# Patient Record
Sex: MALE | Race: BLACK OR AFRICAN AMERICAN | Employment: FULL TIME | URBAN - METROPOLITAN AREA
[De-identification: names, ages, dates, MRNs, and addresses within clinical notes are randomized per-mention and may not be internally consistent; named-entity substitution may affect disease eponyms.]

---

## 2018-02-09 PROBLEM — E66.9 CLASS 2 OBESITY WITH BODY MASS INDEX (BMI) OF 39.0 TO 39.9 IN ADULT: Status: ACTIVE | Noted: 2018-02-09

## 2018-02-09 PROBLEM — K43.6 VENTRAL HERNIA WITH OBSTRUCTION: Status: ACTIVE | Noted: 2018-02-09

## 2018-02-09 PROBLEM — M62.08 RECTUS DIASTASIS: Status: ACTIVE | Noted: 2018-02-09

## 2018-02-09 PROBLEM — M79.89 SOFT TISSUE MASS: Status: ACTIVE | Noted: 2018-02-09

## 2018-02-15 ENCOUNTER — HOSPITAL ENCOUNTER (OUTPATIENT)
Dept: CT IMAGING | Age: 48
Discharge: HOME | End: 2018-02-15
Attending: SURGERY
Payer: COMMERCIAL

## 2018-02-15 DIAGNOSIS — K43.6 VENTRAL HERNIA WITH OBSTRUCTION: ICD-10-CM

## 2018-02-15 DIAGNOSIS — M62.08 RECTUS DIASTASIS: ICD-10-CM

## 2018-02-15 DIAGNOSIS — E66.9 CLASS 2 OBESITY WITH BODY MASS INDEX (BMI) OF 39.0 TO 39.9 IN ADULT, UNSPECIFIED OBESITY TYPE, UNSPECIFIED WHETHER SERIOUS COMORBIDITY PRESENT: ICD-10-CM

## 2018-02-15 PROCEDURE — 74011000258 HC RX REV CODE- 258: Performed by: SURGERY

## 2018-02-15 PROCEDURE — 74177 CT ABD & PELVIS W/CONTRAST: CPT

## 2018-02-15 PROCEDURE — 74011636320 HC RX REV CODE- 636/320: Performed by: SURGERY

## 2018-02-15 RX ORDER — SODIUM CHLORIDE 0.9 % (FLUSH) 0.9 %
10 SYRINGE (ML) INJECTION
Status: COMPLETED | OUTPATIENT
Start: 2018-02-15 | End: 2018-02-15

## 2018-02-15 RX ADMIN — Medication 10 ML: at 11:35

## 2018-02-15 RX ADMIN — IOPAMIDOL 100 ML: 755 INJECTION, SOLUTION INTRAVENOUS at 11:34

## 2018-02-15 RX ADMIN — SODIUM CHLORIDE 100 ML: 900 INJECTION, SOLUTION INTRAVENOUS at 11:35

## 2018-02-15 RX ADMIN — DIATRIZOATE MEGLUMINE AND DIATRIZOATE SODIUM 15 ML: 660; 100 LIQUID ORAL; RECTAL at 11:34

## 2018-03-01 RX ORDER — METRONIDAZOLE 500 MG/100ML
500 INJECTION, SOLUTION INTRAVENOUS
Status: CANCELLED | OUTPATIENT
Start: 2018-03-01 | End: 2018-03-01

## 2018-03-01 RX ORDER — CEFAZOLIN SODIUM IN 0.9 % NACL 2 G/50 ML
2 INTRAVENOUS SOLUTION, PIGGYBACK (ML) INTRAVENOUS ONCE
Status: CANCELLED | OUTPATIENT
Start: 2018-03-21 | End: 2018-03-21

## 2018-03-20 ENCOUNTER — ANESTHESIA EVENT (OUTPATIENT)
Dept: SURGERY | Age: 48
End: 2018-03-20
Payer: COMMERCIAL

## 2018-03-21 ENCOUNTER — ANESTHESIA (OUTPATIENT)
Dept: SURGERY | Age: 48
End: 2018-03-21
Payer: COMMERCIAL

## 2018-03-21 ENCOUNTER — HOSPITAL ENCOUNTER (OUTPATIENT)
Age: 48
Setting detail: OUTPATIENT SURGERY
Discharge: HOME | End: 2018-03-21
Attending: SURGERY | Admitting: SURGERY
Payer: COMMERCIAL

## 2018-03-21 VITALS
WEIGHT: 248.56 LBS | SYSTOLIC BLOOD PRESSURE: 136 MMHG | HEIGHT: 67 IN | TEMPERATURE: 98.3 F | DIASTOLIC BLOOD PRESSURE: 80 MMHG | OXYGEN SATURATION: 100 % | RESPIRATION RATE: 16 BRPM | BODY MASS INDEX: 39.01 KG/M2 | HEART RATE: 62 BPM

## 2018-03-21 DIAGNOSIS — M79.89 SOFT TISSUE MASS: Primary | ICD-10-CM

## 2018-03-21 PROCEDURE — 77030033269 HC SLV COMPR SCD KNE2 CARD -B: Performed by: SURGERY

## 2018-03-21 PROCEDURE — 74011250636 HC RX REV CODE- 250/636

## 2018-03-21 PROCEDURE — 74011250636 HC RX REV CODE- 250/636: Performed by: SURGERY

## 2018-03-21 PROCEDURE — 76210000006 HC OR PH I REC 0.5 TO 1 HR: Performed by: SURGERY

## 2018-03-21 PROCEDURE — 77030002966 HC SUT PDS J&J -A: Performed by: SURGERY

## 2018-03-21 PROCEDURE — 76010000149 HC OR TIME 1 TO 1.5 HR: Performed by: SURGERY

## 2018-03-21 PROCEDURE — 77030019940 HC BLNKT HYPOTHRM STRY -B: Performed by: ANESTHESIOLOGY

## 2018-03-21 PROCEDURE — 76060000033 HC ANESTHESIA 1 TO 1.5 HR: Performed by: SURGERY

## 2018-03-21 PROCEDURE — 77030002996 HC SUT SLK J&J -A: Performed by: SURGERY

## 2018-03-21 PROCEDURE — 77030031139 HC SUT VCRL2 J&J -A: Performed by: SURGERY

## 2018-03-21 PROCEDURE — 77030020782 HC GWN BAIR PAWS FLX 3M -B: Performed by: ANESTHESIOLOGY

## 2018-03-21 PROCEDURE — 77030002916 HC SUT ETHLN J&J -A: Performed by: SURGERY

## 2018-03-21 PROCEDURE — 74011250636 HC RX REV CODE- 250/636: Performed by: ANESTHESIOLOGY

## 2018-03-21 PROCEDURE — 76210000021 HC REC RM PH II 0.5 TO 1 HR: Performed by: SURGERY

## 2018-03-21 PROCEDURE — 88305 TISSUE EXAM BY PATHOLOGIST: CPT | Performed by: SURGERY

## 2018-03-21 PROCEDURE — 77030020143 HC AIRWY LARYN INTUB CGAS -A: Performed by: ANESTHESIOLOGY

## 2018-03-21 PROCEDURE — 74011000250 HC RX REV CODE- 250

## 2018-03-21 PROCEDURE — 77030018836 HC SOL IRR NACL ICUM -A: Performed by: SURGERY

## 2018-03-21 PROCEDURE — 74011000250 HC RX REV CODE- 250: Performed by: SURGERY

## 2018-03-21 PROCEDURE — 77030011640 HC PAD GRND REM COVD -A: Performed by: SURGERY

## 2018-03-21 PROCEDURE — 77030010507 HC ADH SKN DERMBND J&J -B: Performed by: SURGERY

## 2018-03-21 RX ORDER — TRAMADOL HYDROCHLORIDE 50 MG/1
50-100 TABLET ORAL
Qty: 40 TAB | Refills: 0 | Status: SHIPPED | OUTPATIENT
Start: 2018-03-21 | End: 2019-01-16 | Stop reason: ALTCHOICE

## 2018-03-21 RX ORDER — BUPIVACAINE HYDROCHLORIDE AND EPINEPHRINE 5; 5 MG/ML; UG/ML
INJECTION, SOLUTION EPIDURAL; INTRACAUDAL; PERINEURAL AS NEEDED
Status: DISCONTINUED | OUTPATIENT
Start: 2018-03-21 | End: 2018-03-21 | Stop reason: HOSPADM

## 2018-03-21 RX ORDER — OXYCODONE HYDROCHLORIDE 5 MG/1
5 TABLET ORAL
Status: DISCONTINUED | OUTPATIENT
Start: 2018-03-21 | End: 2018-03-21 | Stop reason: HOSPADM

## 2018-03-21 RX ORDER — ONDANSETRON 2 MG/ML
INJECTION INTRAMUSCULAR; INTRAVENOUS AS NEEDED
Status: DISCONTINUED | OUTPATIENT
Start: 2018-03-21 | End: 2018-03-21 | Stop reason: HOSPADM

## 2018-03-21 RX ORDER — METRONIDAZOLE 500 MG/100ML
500 INJECTION, SOLUTION INTRAVENOUS ONCE
Status: DISCONTINUED | OUTPATIENT
Start: 2018-03-21 | End: 2018-03-21 | Stop reason: SDUPTHER

## 2018-03-21 RX ORDER — SODIUM CHLORIDE, SODIUM LACTATE, POTASSIUM CHLORIDE, CALCIUM CHLORIDE 600; 310; 30; 20 MG/100ML; MG/100ML; MG/100ML; MG/100ML
100 INJECTION, SOLUTION INTRAVENOUS CONTINUOUS
Status: DISCONTINUED | OUTPATIENT
Start: 2018-03-21 | End: 2018-03-21 | Stop reason: HOSPADM

## 2018-03-21 RX ORDER — ONDANSETRON 2 MG/ML
4 INJECTION INTRAMUSCULAR; INTRAVENOUS ONCE
Status: DISCONTINUED | OUTPATIENT
Start: 2018-03-21 | End: 2018-03-21 | Stop reason: HOSPADM

## 2018-03-21 RX ORDER — LIDOCAINE HYDROCHLORIDE 10 MG/ML
0.1 INJECTION INFILTRATION; PERINEURAL AS NEEDED
Status: DISCONTINUED | OUTPATIENT
Start: 2018-03-21 | End: 2018-03-21 | Stop reason: HOSPADM

## 2018-03-21 RX ORDER — FENTANYL CITRATE 50 UG/ML
INJECTION, SOLUTION INTRAMUSCULAR; INTRAVENOUS AS NEEDED
Status: DISCONTINUED | OUTPATIENT
Start: 2018-03-21 | End: 2018-03-21 | Stop reason: HOSPADM

## 2018-03-21 RX ORDER — DIPHENHYDRAMINE HYDROCHLORIDE 50 MG/ML
12.5 INJECTION, SOLUTION INTRAMUSCULAR; INTRAVENOUS
Status: DISCONTINUED | OUTPATIENT
Start: 2018-03-21 | End: 2018-03-21 | Stop reason: HOSPADM

## 2018-03-21 RX ORDER — METRONIDAZOLE 500 MG/100ML
500 INJECTION, SOLUTION INTRAVENOUS
Status: DISCONTINUED | OUTPATIENT
Start: 2018-03-21 | End: 2018-03-21 | Stop reason: SDUPTHER

## 2018-03-21 RX ORDER — CEFAZOLIN SODIUM IN 0.9 % NACL 2 G/50 ML
2 INTRAVENOUS SOLUTION, PIGGYBACK (ML) INTRAVENOUS ONCE
Status: DISCONTINUED | OUTPATIENT
Start: 2018-03-21 | End: 2018-03-21 | Stop reason: SDUPTHER

## 2018-03-21 RX ORDER — MIDAZOLAM HYDROCHLORIDE 1 MG/ML
INJECTION, SOLUTION INTRAMUSCULAR; INTRAVENOUS AS NEEDED
Status: DISCONTINUED | OUTPATIENT
Start: 2018-03-21 | End: 2018-03-21 | Stop reason: HOSPADM

## 2018-03-21 RX ORDER — MIDAZOLAM HYDROCHLORIDE 1 MG/ML
2 INJECTION, SOLUTION INTRAMUSCULAR; INTRAVENOUS
Status: DISCONTINUED | OUTPATIENT
Start: 2018-03-21 | End: 2018-03-21 | Stop reason: HOSPADM

## 2018-03-21 RX ORDER — PROPOFOL 10 MG/ML
INJECTION, EMULSION INTRAVENOUS AS NEEDED
Status: DISCONTINUED | OUTPATIENT
Start: 2018-03-21 | End: 2018-03-21 | Stop reason: HOSPADM

## 2018-03-21 RX ORDER — OXYCODONE HYDROCHLORIDE 5 MG/1
10 TABLET ORAL
Status: DISCONTINUED | OUTPATIENT
Start: 2018-03-21 | End: 2018-03-21 | Stop reason: HOSPADM

## 2018-03-21 RX ORDER — NALOXONE HYDROCHLORIDE 0.4 MG/ML
0.1 INJECTION, SOLUTION INTRAMUSCULAR; INTRAVENOUS; SUBCUTANEOUS AS NEEDED
Status: DISCONTINUED | OUTPATIENT
Start: 2018-03-21 | End: 2018-03-21 | Stop reason: HOSPADM

## 2018-03-21 RX ORDER — LIDOCAINE HYDROCHLORIDE 20 MG/ML
INJECTION, SOLUTION EPIDURAL; INFILTRATION; INTRACAUDAL; PERINEURAL AS NEEDED
Status: DISCONTINUED | OUTPATIENT
Start: 2018-03-21 | End: 2018-03-21 | Stop reason: HOSPADM

## 2018-03-21 RX ORDER — HYDROMORPHONE HYDROCHLORIDE 2 MG/ML
0.5 INJECTION, SOLUTION INTRAMUSCULAR; INTRAVENOUS; SUBCUTANEOUS
Status: DISCONTINUED | OUTPATIENT
Start: 2018-03-21 | End: 2018-03-21 | Stop reason: HOSPADM

## 2018-03-21 RX ORDER — ALBUTEROL SULFATE 0.83 MG/ML
2.5 SOLUTION RESPIRATORY (INHALATION) AS NEEDED
Status: DISCONTINUED | OUTPATIENT
Start: 2018-03-21 | End: 2018-03-21 | Stop reason: HOSPADM

## 2018-03-21 RX ORDER — METRONIDAZOLE 500 MG/100ML
500 INJECTION, SOLUTION INTRAVENOUS ONCE
Status: COMPLETED | OUTPATIENT
Start: 2018-03-21 | End: 2018-03-21

## 2018-03-21 RX ORDER — CEFAZOLIN SODIUM/WATER 2 G/20 ML
2 SYRINGE (ML) INTRAVENOUS ONCE
Status: DISCONTINUED | OUTPATIENT
Start: 2018-03-21 | End: 2018-03-21 | Stop reason: SDUPTHER

## 2018-03-21 RX ORDER — DEXAMETHASONE SODIUM PHOSPHATE 4 MG/ML
INJECTION, SOLUTION INTRA-ARTICULAR; INTRALESIONAL; INTRAMUSCULAR; INTRAVENOUS; SOFT TISSUE AS NEEDED
Status: DISCONTINUED | OUTPATIENT
Start: 2018-03-21 | End: 2018-03-21 | Stop reason: HOSPADM

## 2018-03-21 RX ORDER — FENTANYL CITRATE 50 UG/ML
100 INJECTION, SOLUTION INTRAMUSCULAR; INTRAVENOUS ONCE
Status: DISCONTINUED | OUTPATIENT
Start: 2018-03-21 | End: 2018-03-21 | Stop reason: HOSPADM

## 2018-03-21 RX ORDER — CEFAZOLIN SODIUM/WATER 2 G/20 ML
2 SYRINGE (ML) INTRAVENOUS ONCE
Status: COMPLETED | OUTPATIENT
Start: 2018-03-21 | End: 2018-03-21

## 2018-03-21 RX ORDER — MIDAZOLAM HYDROCHLORIDE 1 MG/ML
2 INJECTION, SOLUTION INTRAMUSCULAR; INTRAVENOUS ONCE
Status: DISCONTINUED | OUTPATIENT
Start: 2018-03-21 | End: 2018-03-21 | Stop reason: HOSPADM

## 2018-03-21 RX ADMIN — Medication 2 G: at 07:30

## 2018-03-21 RX ADMIN — MIDAZOLAM HYDROCHLORIDE 2 MG: 1 INJECTION, SOLUTION INTRAMUSCULAR; INTRAVENOUS at 07:22

## 2018-03-21 RX ADMIN — DEXAMETHASONE SODIUM PHOSPHATE 8 MG: 4 INJECTION, SOLUTION INTRA-ARTICULAR; INTRALESIONAL; INTRAMUSCULAR; INTRAVENOUS; SOFT TISSUE at 08:06

## 2018-03-21 RX ADMIN — SODIUM CHLORIDE, SODIUM LACTATE, POTASSIUM CHLORIDE, AND CALCIUM CHLORIDE 100 ML/HR: 600; 310; 30; 20 INJECTION, SOLUTION INTRAVENOUS at 06:17

## 2018-03-21 RX ADMIN — FENTANYL CITRATE 100 MCG: 50 INJECTION, SOLUTION INTRAMUSCULAR; INTRAVENOUS at 07:28

## 2018-03-21 RX ADMIN — METRONIDAZOLE 500 MG: 500 INJECTION, SOLUTION INTRAVENOUS at 06:17

## 2018-03-21 RX ADMIN — ONDANSETRON 4 MG: 2 INJECTION INTRAMUSCULAR; INTRAVENOUS at 08:06

## 2018-03-21 RX ADMIN — PROPOFOL 50 MG: 10 INJECTION, EMULSION INTRAVENOUS at 07:30

## 2018-03-21 RX ADMIN — PROPOFOL 200 MG: 10 INJECTION, EMULSION INTRAVENOUS at 07:28

## 2018-03-21 RX ADMIN — LIDOCAINE HYDROCHLORIDE 100 MG: 20 INJECTION, SOLUTION EPIDURAL; INFILTRATION; INTRACAUDAL; PERINEURAL at 07:28

## 2018-03-21 NOTE — IP AVS SNAPSHOT
Simi Irwin 
 
 
 [address] [facility] [address] 
[phone] Patient: Esther Sánchez MRN: [id number] :1970 About your hospitalization You were admitted on:  2018 You last received care in the:  [facility] PACU You were discharged on:  2018 Why you were hospitalized Your primary diagnosis was:  Not on File Follow-up Information Follow up With Details Comments Contact Info Anum Sullivan MD   [address] [facility] [place] Cara Victoria MD Follow up on 2018 at 9:00 a.m. with Austin Wharton, [address] [address] 
[phone] Your Scheduled Appointments   9:00 AM EDT Global Post Op with JOHAN Hou  
[facility] ([facility]) [address] [place] [address]  
[phone] Discharge Orders None A check earnest indicates which time of day the medication should be taken. My Medications START taking these medications Instructions Each Dose to Equal  
 Morning Noon Evening Bedtime  
 traMADol 50 mg tablet Commonly known as:  ULTRAM  
   
Your last dose was: Your next dose is: Take 1-2 Tabs by mouth every six (6) hours as needed. Max Daily Amount: 400 mg. May combine with Tylenol or ibuprofen as directed  mg CONTINUE taking these medications Instructions Each Dose to Equal  
 Morning Noon Evening Bedtime  
 multivitamin tablet Commonly known as:  ONE A DAY Your last dose was: Your next dose is: Take 1 Tab by mouth daily. Stop seven days prior to surgery per anesthesia protocol. 1 Tab Where to Get Your Medications Information on where to get these meds will be given to you by the nurse or doctor. ! Ask your nurse or doctor about these medications  
  traMADol 50 mg tablet Discharge Instructions To whom it may concern: This is to certify James Virgen is excused from work on the following dates:  3/21/18 - 3/26/18 Please feel free to contact Dr Elayne Delgado office [facility]) with any questions or concerns. Thank you for your assistance in this matter. Sincerely, Leave dressing for 4 days, then remove. Leave glue dressing intact until seen in follow up. May cover with large band aid. OK to shower tomorrow but do not spray water directly into wound. No heavy lifting (>10lbs) for 4 weeks to reduce risk of disrupting repair. May resume normal activity including walking, which is encouraged to reduce risk of blood clots. No driving until you are completely off pain meds for 24hrs and have no pain with movements associated with driving. Pain prescription on chart for patient to use as needed for pain Work excuse until Monday 3/26/2018 Follow-up with Dr Elayne PRADO, Mary Hardy in 2 weeks ([phone]) ACTIVITY · As tolerated and as directed by your doctor. · Bathe or shower as directed by your doctor. DIET · Clear liquids until no nausea or vomiting; then light diet for the first day. · Advance to regular diet on second day, unless your doctor orders otherwise. · If nausea and vomiting continues, call your doctor. PAIN 
· Take pain medication as directed by your doctor. · Call your doctor if pain is NOT relieved by medication. · DO NOT take aspirin of blood thinners unless directed by your doctor. CALL YOUR DOCTOR IF  
· Excessive bleeding that does not stop after holding pressure over the area · Temperature of 101 degrees F or above · Excessive redness, swelling or bruising, and/ or green or yellow, smelly discharge from incision AFTER ANESTHESIA · For the first 24 hours: DO NOT Drive, Drink alcoholic beverages, or Make important decisions. · Be aware of dizziness following anesthesia and while taking pain medication. After general anesthesia or intravenous sedation, for 24 hours or while taking prescription Narcotics: · Limit your activities · Do not drive and operate hazardous machinery · Do not make important personal or business decisions · Do  not drink alcoholic beverages · If you have not urinated within 8 hours after discharge, please contact your surgeon on call. *  Please give a list of your current medications to your Primary Care Provider. *  Please update this list whenever your medications are discontinued, doses are 
    changed, or new medications (including over-the-counter products) are added. *  Please carry medication information at all times in case of emergency situations. These are general instructions for a healthy lifestyle: No smoking/ No tobacco products/ Avoid exposure to second hand smoke Surgeon General's Warning:  Quitting smoking now greatly reduces serious risk to your health. Obesity, smoking, and sedentary lifestyle greatly increases your risk for illness A healthy diet, regular physical exercise & weight monitoring are important for maintaining a healthy lifestyle You may be retaining fluid if you have a history of heart failure or if you experience any of the following symptoms:  Weight gain of 3 pounds or more overnight or 5 pounds in a week, increased swelling in our hands or feet or shortness of breath while lying flat in bed. Please call your doctor as soon as you notice any of these symptoms; do not wait until your next office visit. Recognize signs and symptoms of STROKE: 
 
F-face looks uneven A-arms unable to move or move unevenly S-speech slurred or non-existent T-time-call 911 as soon as signs and symptoms begin-DO NOT go Back to bed or wait to see if you get better-TIME IS BRAIN. Introducing [facility]! Arcadio Johnson introduces [employer] patient portal. Now you can access parts of your medical record, email your doctor's office, and request medication refills online. 1. In your internet browser, go to [email or web address] 2. Click on the First Time User? Click Here link in the Sign In box. You will see the New Member Sign Up page. 3. Enter your [employer] Access Code exactly as it appears below. You will not need to use this code after youve completed the sign-up process. If you do not sign up before the expiration date, you must request a new code. · [employer] Access Code: [email or web address] Expires: 4/26/2018  2:17 PM 
 
4. Enter the last four digits of your Social Security Number (xxxx) and Date of Birth (mm/dd/yyyy) as indicated and click Submit. You will be taken to the next sign-up page. 5. Create a [employer] ID. This will be your [employer] login ID and cannot be changed, so think of one that is secure and easy to remember. 6. Create a [employer] password. You can change your password at any time. 7. Enter your Password Reset Question and Answer. This can be used at a later time if you forget your password. 8. Enter your e-mail address. You will receive e-mail notification when new information is available in [address] 9. Click Sign Up. You can now view and download portions of your medical record. 10. Click the Download Summary menu link to download a portable copy of your medical information. If you have questions, please visit the Frequently Asked Questions section of the MyChart website. Remember, [employer] is NOT to be used for urgent needs. For medical emergencies, dial 911. Now available from your iPhone and Android! Providers Seen During Your Hospitalization Provider Specialty Primary office phone Sabine Berry MD General Surgery [phone] Your Primary Care Physician (PCP) Primary Care Physician Office Phone Office Fax [address], [facility] You are allergic to the following No active allergies Recent Documentation Height Weight BMI Smoking Status 1.702 m 112.7 kg 38.93 kg/m2 Never Smoker Emergency Contacts Name Discharge Info Relation Home Work Mobile Michell Garibay  Spouse [3] [phone] Patient Belongings The following personal items are in your possession at time of discharge: 
  Dental Appliances: None         Home Medications: None   Jewelry: Watch, Ring  Clothing: Socks, Undergarments, Footwear, Pants, Shirt    Other Valuables: None Please provide this summary of care documentation to your next provider. Signatures-by signing, you are acknowledging that this After Visit Summary has been reviewed with you and you have received a copy. Patient Signature:  ____________________________________________________________ Date:  ____________________________________________________________  
  
Fernando Zarate Provider Signature:  ____________________________________________________________ Date:  ____________________________________________________________

## 2018-03-21 NOTE — ANESTHESIA POSTPROCEDURE EVALUATION
Post-Anesthesia Evaluation and Assessment    Patient: Shelton Come MRN: [id number]  SSN: [id number]    YOB: 1970  Age: 50 y.o. Sex: male       Cardiovascular Function/Vital Signs  Visit Vitals    /80 (BP 1 Location: Left arm, BP Patient Position: Supine; At rest)    Pulse 62    Temp 36.8 °C (98.3 °F)    Resp 16    Ht 5' 7\" (1.702 m)    Wt 112.7 kg (248 lb 9 oz)    SpO2 100%    BMI 38.93 kg/m2       Patient is status post general anesthesia for Procedure(s):  EXCISION SOFT TISSUE MASS OF POSTERIOR RIGHT INNER THIGH. Nausea/Vomiting: None    Postoperative hydration reviewed and adequate. Pain:  Pain Scale 1: Numeric (0 - 10) (03/21/18 0914)  Pain Intensity 1: 0 (03/21/18 0914)   Managed    Neurological Status:   Neuro (WDL): Within Defined Limits (03/21/18 0914)  Neuro  Neurologic State: Drowsy (03/21/18 0846)  LUE Motor Response: Purposeful (03/21/18 0846)  LLE Motor Response: Purposeful (03/21/18 0846)  RUE Motor Response: Purposeful (03/21/18 0846)  RLE Motor Response: Purposeful (03/21/18 0846)   At baseline    Mental Status and Level of Consciousness: Arousable    Pulmonary Status:   O2 Device: Room air (03/21/18 0914)   Adequate oxygenation and airway patent    Complications related to anesthesia: None    Post-anesthesia assessment completed.  No concerns    Signed By: Marielena Ferraro MD     March 21, 2018

## 2018-03-21 NOTE — BRIEF OP NOTE
BRIEF OPERATIVE NOTE    Date of Procedure: 3/21/2018   Preoperative Diagnosis: Mass of soft tissue [M79.9]  Postoperative Diagnosis: Mass of soft tissue [M79.9]    Procedure(s):  EXCISION SOFT TISSUE MASS OF POSTERIOR RIGHT INNER THIGH  Surgeon(s) and Role:     * Wilfred Demarco MD - Primary         Assistant Staff: None      Surgical Staff:  Circ-1: Michelle Ibanez RN  Circ-Relief: Sergio Andrea  Scrub Tech-1: Ovidio Rodriguez  Event Time In   Incision Start 0745   Incision Close 0825     Anesthesia: General   Estimated Blood Loss: <20 cc  Specimens:   ID Type Source Tests Collected by Time Destination   1 : SOFT TISSUE MASS RIGHT THIGH Preservative Thigh  Wilfred Demarco MD 3/21/2018 0800 Pathology      Findings: 6 x 4 x 7 cm soft tissue mass that extended down to but not within muscle. Almost felt like mass within mass. I did not bisect off table. Sent intact to Pathology. Deep closure with 0-PDS. Skin closed with subcuticular 4-0 vicryl and Dermabond.   Dressed with Telfa and Tegaderm   Complications: leida apparent  Implants: * No implants in log *    Wilfred Demarco MD

## 2018-03-21 NOTE — DISCHARGE INSTRUCTIONS
To whom it may concern: This is to certify Debbi Meyer is excused from work on the following dates:  3/21/18 - 3/26/18  Please feel free to contact Dr Shahbaz Jefferson office [facility]) with any questions or concerns. Thank you for your assistance in this matter. Sincerely,                                                                                                                                                                                                                                                                                                                                                                                                    Leave dressing for 4 days, then remove. Leave glue dressing intact until seen in follow up. May cover with large band aid. OK to shower tomorrow but do not spray water directly into wound. No heavy lifting (>10lbs) for 4 weeks to reduce risk of disrupting repair. May resume normal activity including walking, which is encouraged to reduce risk of blood clots. No driving until you are completely off pain meds for 24hrs and have no pain with movements associated with driving. Pain prescription on chart for patient to use as needed for pain   Work excuse until Monday 3/26/2018  Follow-up with Melva Hood in 2 weeks ([phone])     ACTIVITY  · As tolerated and as directed by your doctor. · Bathe or shower as directed by your doctor. DIET  · Clear liquids until no nausea or vomiting; then light diet for the first day. · Advance to regular diet on second day, unless your doctor orders otherwise. · If nausea and vomiting continues, call your doctor. PAIN  · Take pain medication as directed by your doctor. · Call your doctor if pain is NOT relieved by medication. · DO NOT take aspirin of blood thinners unless directed by your doctor.      CALL YOUR DOCTOR IF · Excessive bleeding that does not stop after holding pressure over the area  · Temperature of 101 degrees F or above  · Excessive redness, swelling or bruising, and/ or green or yellow, smelly discharge from incision    AFTER ANESTHESIA   · For the first 24 hours: DO NOT Drive, Drink alcoholic beverages, or Make important decisions. · Be aware of dizziness following anesthesia and while taking pain medication. After general anesthesia or intravenous sedation, for 24 hours or while taking prescription Narcotics:  · Limit your activities  · Do not drive and operate hazardous machinery  · Do not make important personal or business decisions  · Do  not drink alcoholic beverages  · If you have not urinated within 8 hours after discharge, please contact your surgeon on call. *  Please give a list of your current medications to your Primary Care Provider. *  Please update this list whenever your medications are discontinued, doses are      changed, or new medications (including over-the-counter products) are added. *  Please carry medication information at all times in case of emergency situations. These are general instructions for a healthy lifestyle:    No smoking/ No tobacco products/ Avoid exposure to second hand smoke    Surgeon General's Warning:  Quitting smoking now greatly reduces serious risk to your health. Obesity, smoking, and sedentary lifestyle greatly increases your risk for illness    A healthy diet, regular physical exercise & weight monitoring are important for maintaining a healthy lifestyle    You may be retaining fluid if you have a history of heart failure or if you experience any of the following symptoms:  Weight gain of 3 pounds or more overnight or 5 pounds in a week, increased swelling in our hands or feet or shortness of breath while lying flat in bed.   Please call your doctor as soon as you notice any of these symptoms; do not wait until your next office visit. Recognize signs and symptoms of STROKE:    F-face looks uneven    A-arms unable to move or move unevenly    S-speech slurred or non-existent    T-time-call 911 as soon as signs and symptoms begin-DO NOT go       Back to bed or wait to see if you get better-TIME IS BRAIN.

## 2018-03-21 NOTE — H&P
[facility]  [address] [place] [address]  [phone]    H&P Note   Aniceto Bailey   MRN: [id number]     : 1970        HPI: Aniceto Bailey is a 50 y.o. male who returns to the office following CT examination of his abdominal wall. I reviewed the images and the report as well as select images are attached below. He has had no additional symptoms. He is without pain. Interestingly, the defect in his abdominal wall appears to be as large as his rectus diastases. This very wide mouth hernia contains only fat and is very low risk for incarceration given its wide mouth. To repair it would require a large prosthetic mesh. His bowel is currently at very low risk for incarceration or strangulation and therefore the risk of repair greatly outweighs observation. We discussed that we would move on to excise his recurrent soft tissue mass on his thigh. He was referred by Dr. Dana Omalley for 2 problems. The patient has a long-standing history of a periumbilical ventral hernia. He has noted it as far back as he can remember. 2 weeks ago it became more tender and possibly increased in bulging. A photo that was taken by his wife was shown to me and the picture looks no different than the current configuration. He denies any bowel obstructive symptoms. He denies any prior repairs. He does not smoke. He does not take corticosteroids. He does not have chronic lung disease. He is overweight but has lost weight. Today he weighs 252 pounds. One year ago he weighed 264 pounds. He has gained weight over time. His weight was between 210-220 pounds  10 years ago. He works for the [employer]. He typically is lifting every day. He will generally lift objects from 2 pounds to 35 pounds in weight. His second issue is related to a posterior thigh soft tissue mass on the right thigh. This resides up near the gluteal cleft.   He reports that this was excised at 2 years ago by Dr. Taylor Underwood.  He said that it probably went down half of its size. He continues to get larger. It was reported to be a lipoma. It does not cause him any pain but it is in the way of daily activities. It has not drained or become reddened or inflamed. No past medical history on file. No past surgical history on file. No current facility-administered medications for this encounter. Current Outpatient Prescriptions   Medication Sig    multivitamin (ONE A DAY) tablet Take 1 Tab by mouth daily. Stop seven days prior to surgery per anesthesia protocol. ALLERGIES:  Review of patient's allergies indicates no known allergies. Social History     Social History    Marital status:      Spouse name: N/A    Number of children: N/A    Years of education: N/A     Social History Main Topics    Smoking status: Never Smoker    Smokeless tobacco: Never Used    Alcohol use No    Drug use: No    Sexual activity: Yes     Partners: Female     Other Topics Concern    Not on file     Social History Narrative     History   Smoking Status    Never Smoker   Smokeless Tobacco    Never Used     Family History   Problem Relation Age of Onset    Diabetes Mother        ROS: The patient has no difficulty with chest pain or shortness of breath. No fever or chills. The patient denies any personal or family history of abnormal clotting or bleeding. Comprehensive review of systems was otherwise unremarkable except as noted above. Physical Exam:   Constitutional: Alert oriented cooperative patient in no acute distress. Visit Vitals    BP (!) 141/91 (BP 1 Location: Left arm, BP Patient Position: Sitting)    Pulse 73    Resp 19    Ht 5' 7\" (1.702 m)    Wt 114.7 kg (252 lb 14.4 oz)    SpO2 96%    BMI 39.61 kg/m2       Eyes:Sclera are clear without icterus. ENMT: no obvious neck masses, no ear or lip lesions  CV: RRR. Normal perfusion  Resp: No JVD. Breathing is  non-labored.     GI: obese, obvious multilobulated bulge periumbilical, soft and non-distended, otilio-umbilical herniated contents that extends for a 6-8 cm length. Manipulation of contents elicits some tenderness. No attempt made to reduce contents. Contents consistent with fatty tissue rather than bowel. Actual size of defect not able to be palpated. No umbilical indentation. Greater than 8 cm rectus diastases. Musculoskeletal: unremarkable with normal function. Right posterior thigh just inferior to gluteal cleft and somewhat medial with a skin covered protuberant mass with old healed transverse scar. The mass is spongy. The base is not clearly palpable or indistinguishable from surrounding fatty tissue. Mass measurements are 4 cm x 7 cm x 3.5 cm. Nontender. No fluctuance. No erythema. Neuro:  No obvious focal deficits  Psychiatric: normal affect and mood, no memory impairment    Lab Results   Component Value Date/Time    WBC 6.3 01/30/2018 10:24 AM    HGB 13.0 01/30/2018 10:24 AM    HCT 41.5 01/30/2018 10:24 AM    PLATELET 438 02/36/0400 10:24 AM    MCV 82.8 (L) 01/30/2018 10:24 AM       Lab Results   Component Value Date/Time    Sodium 142 01/30/2018 10:24 AM    Potassium 4.5 01/30/2018 10:24 AM    Chloride 105 01/30/2018 10:24 AM    CO2 25 01/30/2018 10:24 AM    BUN 13 01/30/2018 10:24 AM    Creatinine 1.1 01/30/2018 10:24 AM    Glucose 80 01/30/2018 10:24 AM    Bilirubin, total 0.30 01/30/2018 10:24 AM    AST (SGOT) 31 01/30/2018 10:24 AM    Alk. phosphatase 51 01/30/2018 10:24 AM     CT ABDOMEN AND PELVIS WITH CONTRAST: 2/15/2018  HISTORY: Rectus diastases. Evaluate for incarcerated abdominal wall hernia  TECHNIQUE: The patient received oral contrast and 100 mL Isovue-370 nonionic IV  contrast. Axial images were obtained through the abdomen and pelvis. Coronal  reformatted images were generated.   All CT scans at this facility used dose  modulation, interactive reconstruction and/or weight based dosing when  appropriate to reduce radiation dose to as low as reasonably achievable. COMPARISON: None  FINDINGS: Included portions of the lung bases are clear. ABDOMEN: Enteric contrast is present throughout nondilated small bowel loops. There is protrusion of fat into a supraumbilical hernia (image 47). The gallbladder, liver, pancreas, spleen, and adrenal glands are normal in  appearance. The kidneys enhance symmetrically and there is no hydronephrosis. Bilateral low-attenuation renal cortical lesions are likely cysts. Sonography  would be beneficial for confirmation. The appendix is normal in appearance. PELVIS: The bladder is normal in appearance. There is no free pelvic fluid. There are no aggressive osseous lesions.     IMPRESSION: Supraumbilical anterior abdominal wall hernia containing fat.             Assessment/Plan:     Nicol Leavitt is a 50 y.o. male who has 2 current problems. First, he has a periumbilical abdominal wall hernia that appears to be incarcerated with abdominal fatty tissue. It has a wide mouth and contains only fat and is low risk for injury to bowel. I recommend that we continue to observe this area as it would require a large piece of mesh to repair. This carries risk and right now the risk outweighs observation. He will not be limited in any of his activities. He has a recurrent soft tissue mass on the upper right thigh. 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 It is large and is clearly in the way of normal activities with this location being on that part of the posterior thigh near the gluteal cleft. I think this needs to be removed. This will require general anesthesia in the OR. We put him in multiple positions to find the best position for excision. I believe that a right-side-down lateral with some twist of the body would give the best access for excision.   We talked about recurrence rates of these excisions and the small likelihood that this is other than a benign lipoma. I discussed the patient's condition and treatment options with the patient. I discussed risks of surgery in language the patient could understand including bleeding, infection, scarring, nerve injury, recurrence, need for further surgery, abscess, fistula, SBO, DVT, PE, heart attack, stroke, renal failure, respiratory failure, ventilatory dependence, and death. The patient voiced understanding of all this and all questions were answered. Alternatives to surgery were discussed also and risks of the alternatives. The patient requested that we proceed with surgery. Informed consent was obtained.      Problem List  Date Reviewed: 2/27/2018          Codes Class Noted    Ventral hernia with obstruction ICD-10-CM: K43.6  ICD-9-CM: 552.20  2/9/2018        Rectus diastasis ICD-10-CM: M62.08  ICD-9-CM: 728.84  2/9/2018        Class 2 obesity with body mass index (BMI) of 39.0 to 39.9 in adult ICD-10-CM: E66.9, Z68.39  ICD-9-CM: 278.00, V85.39  2/9/2018        Soft tissue mass ICD-10-CM: M79.9  ICD-9-CM: 729.90  2/9/2018                Shai Landry MD,  FACS

## 2018-03-27 NOTE — OP NOTES
[facility] [address], [address] [facility]  [phone]    [facility] REPORT    Name: Denise Barr     Date of Surgery: 3/21/2018  Med Record Number: [id number]   Age: 50 y.o. Sex: male   Preoperative Diagnosis: Mass of soft tissue [M79.9]  Postoperative Diagnosis: Mass of soft tissue [M79.9]    Procedure(s):  EXCISION SOFT TISSUE MASS OF POSTERIOR RIGHT INNER THIGH  Surgeon(s) and Role:     * Dionna Reyes MD - Primary      Assistant Staff: None        Surgical Staff:  Circ-1: Farrel Pallas, RN  Circ-Relief: Samir Rouse  Scrub Tech-1: Magdalene Bile  Event Time In   Incision Start 0745   Incision Close 0825      Anesthesia: General   Estimated Blood Loss: <20 cc  Specimens:   ID Type Source Tests Collected by Time Destination   1 : SOFT TISSUE MASS RIGHT THIGH Preservative Thigh   Dionna Reyes MD 3/21/2018 0800 Pathology       Findings: 6 x 4 x 7 cm soft tissue mass that extended down to but not within muscle. Almost felt like mass within mass. I did not bisect off table. Sent intact to Pathology. Deep closure with 0-PDS. Skin closed with subcuticular 4-0 vicryl and Dermabond. Dressed with Telfa and Tegaderm   Complications: leida apparent  Implants: * No implants in log *    Procedure Description:   The risks, benefits, potential complications, treatment options, and expected outcomes were discussed with the patient pre-operatively. The patient voiced understanding and gave informed consent preoperatively. The patient was taken to the Operating Room, and the [facility] time-out protocol checklist was followed. After the induction of adequate anesthesia, the patient was repositioned in R side down lateral position with a bean bag to expose inner upper thigh of R leg to exposed the large protruding soft tissue mass that was clearly marked. He was then prepped with chloraprep and draped sterilely.   The lesion was covered with skin and had old scar from prior excision. An elliptical incision was planned and marked. This would include a normal border of skin. The area was infiltrated with local anesthesia. Incision was made with #15 scalpel and carried down to subcutaneous fat. The palpable lesion extended deep in the subcutaneous tissues and was dissected free using blunt, sharp and electrosurgical dissection. There was a palpable difference between the mass and surrounding normal fat. The lesion extended down to the deep fascia but did not appear to penetrate beneath it. The specimen was removed and sent to pathology. It measured approximately 6 x 4 x 7 cm. The wound was irrigated then confirmed hemostatic with addition use of monopolar electrosurgical energy device. All 30 cc of local was used. The deep subcutaneous fascia was closed with 0-PDS suture. The skin was then reconstructed using subcuticular 4-0 vicryl and Dermabond. The wounds were dressed sterilely with telfa and tegaderm. All sponge, instruments, and needle counts were correct. The patient was taken to recovery in good condition.     Alfa Johnson MD, FACS

## 2018-04-05 PROBLEM — Z86.018 S/P EXCISION OF LIPOMA: Status: ACTIVE | Noted: 2018-04-05

## 2018-04-05 PROBLEM — E66.01 OBESITY, MORBID (HCC): Status: ACTIVE | Noted: 2018-04-05

## 2018-04-05 PROBLEM — Z98.890 S/P EXCISION OF LIPOMA: Status: ACTIVE | Noted: 2018-04-05

## 2019-03-20 ENCOUNTER — ANESTHESIA EVENT (OUTPATIENT)
Dept: ENDOSCOPY | Age: 49
End: 2019-03-20
Payer: COMMERCIAL

## 2019-03-20 NOTE — H&P
[place] [address]  [phone]    H&P Note   Zuleika Wells   MRN: [id number]     : 1970        HPI: Zuleika Wells is a 46 y.o. male who is referred by Dr. Ericka Ardon for initial screening colonoscopy. The patient has never had a colonoscopy. He does not have any GI symptoms. He appears to be of average risk. He reports 1-2 normal bowel movements per day. He denies any blood or mucus per rectum. His past surgical history is negative for any prior abdominal surgery or perianal surgeries. He has not had any hemorrhoid surgeries. He denies a history of therapeutic radiation. His family history is negative for colon cancer, Crohn's disease, ulcerative colitis to the best of his knowledge. I have seen him in the past for evaluation of a chronically incarcerated umbilical hernia and a rectus diastases. This remains stable. I also excised a soft tissue mass from his thigh. No past medical history on file. Past Surgical History:   Procedure Laterality Date    HX CYST REMOVAL       No current facility-administered medications for this encounter. No current outpatient medications on file. ALLERGIES:  Patient has no known allergies. Social History     Socioeconomic History    Marital status:      Spouse name: Not on file    Number of children: Not on file    Years of education: Not on file    Highest education level: Not on file   Tobacco Use    Smoking status: Never Smoker    Smokeless tobacco: Never Used   Substance and Sexual Activity    Alcohol use: No    Drug use: No    Sexual activity: Yes     Partners: Female     Social History     Tobacco Use   Smoking Status Never Smoker   Smokeless Tobacco Never Used     Family History   Problem Relation Age of Onset    Diabetes Mother        ROS: The patient has no difficulty with chest pain or shortness of breath. No fever or chills.   The patient denies any personal or family history of abnormal clotting or bleeding. Comprehensive review of systems was otherwise unremarkable except as noted above. Physical Exam:   Constitutional: Alert oriented cooperative patient in no acute distress. Visit Vitals  /78 (BP 1 Location: Left arm, BP Patient Position: Sitting)   Pulse 62   Resp 19   Ht 5' 7\" (1.702 m)   Wt 252 lb 14.4 oz (114.7 kg)   SpO2 99%   BMI 39.61 kg/m²     Eyes:Sclera are clear without icterus. ENMT: no obvious neck masses, no ear or lip lesions  CV: RRR. Normal perfusion  Resp: No JVD. Breathing is  non-labored. GI: obese, obvious multilobulated bulge periumbilical, soft and non-distended, otilio-umbilical herniated contents that extends for a 6-8 cm length. Manipulation of contents elicits some tenderness. No attempt made to reduce contents. Contents consistent with fatty tissue rather than bowel. Actual size of defect not able to be palpated, but bigger than finger tip. No umbilical indentation. Greater than 8 cm rectus diastases. Musculoskeletal: unremarkable with normal function. Neuro:  No obvious focal deficits  Psychiatric: normal affect and mood, no memory impairment    Lab Results   Component Value Date/Time    WBC 6.5 01/16/2019 04:25 PM    HGB 12.7 (L) 01/16/2019 04:25 PM    HCT 42.4 01/16/2019 04:25 PM    PLATELET 724 10/22/1408 04:25 PM    MCV 87 01/16/2019 04:25 PM       Lab Results   Component Value Date/Time    Sodium 137 01/16/2019 04:25 PM    Potassium 4.4 01/16/2019 04:25 PM    Chloride 103 01/16/2019 04:25 PM    CO2 20 01/16/2019 04:25 PM    BUN 12 01/16/2019 04:25 PM    Creatinine 1.15 01/16/2019 04:25 PM    Glucose 82 01/16/2019 04:25 PM    Bilirubin, total 0.3 01/16/2019 04:25 PM    AST (SGOT) 24 01/16/2019 04:25 PM    Alk. phosphatase 54 01/16/2019 04:25 PM       Assessment/Plan:     Kay Echevarria is a 46 y.o. male who presents for initial screening colonoscopy.   He appears to be of average risk. He should do well with a 1 day prep. He should do well with adult colonoscope. We discussed proceeding with colonoscopy. I discussed the patient's condition and treatment options with the patient. I discussed risks of colonoscopy in language the patient could understand including bleeding, infection, aspiration, perforation, medication reaction, need for further endoscopy or surgery, abscess, fistula, SBO, DVT, PE, heart attack, stroke, renal failure, respiratory failure, ventilatory dependence, and death. The patient voiced understanding of all this and all questions were answered. Alternatives to colonoscopy were discussed also and risks of the alternatives. The patient requested that we proceed with colonoscopy. Informed consent was obtained.      Problem List  Date Reviewed: 2/19/2019          Codes Class Noted    Obesity, morbid ([facility] [place] [facility] ICD-10-CM: E66.01  ICD-9-CM: 278.01  4/5/2018        S/P excision of lipoma ICD-10-CM: Z98.890, Z86.018  ICD-9-CM: V45.89  4/5/2018        Ventral hernia with obstruction ICD-10-CM: K43.6  ICD-9-CM: 552.20  2/9/2018        Rectus diastasis ICD-10-CM: M62.08  ICD-9-CM: 728.84  2/9/2018        Class 2 obesity with body mass index (BMI) of 39.0 to 39.9 in adult ICD-10-CM: E66.9, Z68.39  ICD-9-CM: 278.00, V85.39  2/9/2018        Soft tissue mass ICD-10-CM: M79.9  ICD-9-CM: 729.90  2/9/2018                Sara Solis MD,  FACS

## 2019-03-21 ENCOUNTER — ANESTHESIA (OUTPATIENT)
Dept: ENDOSCOPY | Age: 49
End: 2019-03-21
Payer: COMMERCIAL

## 2019-03-21 ENCOUNTER — HOSPITAL ENCOUNTER (OUTPATIENT)
Age: 49
Setting detail: OUTPATIENT SURGERY
Discharge: HOME | End: 2019-03-21
Attending: SURGERY | Admitting: SURGERY
Payer: COMMERCIAL

## 2019-03-21 VITALS
BODY MASS INDEX: 37.98 KG/M2 | HEIGHT: 67 IN | HEART RATE: 52 BPM | TEMPERATURE: 98.6 F | RESPIRATION RATE: 16 BRPM | DIASTOLIC BLOOD PRESSURE: 74 MMHG | OXYGEN SATURATION: 95 % | SYSTOLIC BLOOD PRESSURE: 112 MMHG | WEIGHT: 242 LBS

## 2019-03-21 PROCEDURE — 77030009426 HC FCPS BIOP ENDOSC BSC -B: Performed by: SURGERY

## 2019-03-21 PROCEDURE — 76060000032 HC ANESTHESIA 0.5 TO 1 HR: Performed by: SURGERY

## 2019-03-21 PROCEDURE — 77030013991 HC SNR POLYP ENDOSC BSC -A: Performed by: SURGERY

## 2019-03-21 PROCEDURE — 88305 TISSUE EXAM BY PATHOLOGIST: CPT

## 2019-03-21 PROCEDURE — 74011250636 HC RX REV CODE- 250/636: Performed by: ANESTHESIOLOGY

## 2019-03-21 PROCEDURE — 76040000026: Performed by: SURGERY

## 2019-03-21 RX ORDER — SODIUM CHLORIDE, SODIUM LACTATE, POTASSIUM CHLORIDE, CALCIUM CHLORIDE 600; 310; 30; 20 MG/100ML; MG/100ML; MG/100ML; MG/100ML
75 INJECTION, SOLUTION INTRAVENOUS CONTINUOUS
Status: DISCONTINUED | OUTPATIENT
Start: 2019-03-21 | End: 2019-03-21 | Stop reason: HOSPADM

## 2019-03-21 RX ORDER — SODIUM CHLORIDE 0.9 % (FLUSH) 0.9 %
5-40 SYRINGE (ML) INJECTION AS NEEDED
Status: CANCELLED | OUTPATIENT
Start: 2019-03-21

## 2019-03-21 RX ORDER — PROPOFOL 10 MG/ML
INJECTION, EMULSION INTRAVENOUS AS NEEDED
Status: DISCONTINUED | OUTPATIENT
Start: 2019-03-21 | End: 2019-03-21 | Stop reason: HOSPADM

## 2019-03-21 RX ORDER — PROPOFOL 10 MG/ML
INJECTION, EMULSION INTRAVENOUS
Status: DISCONTINUED | OUTPATIENT
Start: 2019-03-21 | End: 2019-03-21 | Stop reason: HOSPADM

## 2019-03-21 RX ORDER — HYDROMORPHONE HYDROCHLORIDE 2 MG/ML
0.5 INJECTION, SOLUTION INTRAMUSCULAR; INTRAVENOUS; SUBCUTANEOUS
Status: CANCELLED | OUTPATIENT
Start: 2019-03-21

## 2019-03-21 RX ORDER — SODIUM CHLORIDE 0.9 % (FLUSH) 0.9 %
5-40 SYRINGE (ML) INJECTION EVERY 8 HOURS
Status: CANCELLED | OUTPATIENT
Start: 2019-03-21

## 2019-03-21 RX ORDER — OXYCODONE HYDROCHLORIDE 5 MG/1
5 TABLET ORAL
Status: CANCELLED | OUTPATIENT
Start: 2019-03-21 | End: 2019-03-22

## 2019-03-21 RX ORDER — OXYCODONE AND ACETAMINOPHEN 10; 325 MG/1; MG/1
1 TABLET ORAL AS NEEDED
Status: CANCELLED | OUTPATIENT
Start: 2019-03-21

## 2019-03-21 RX ADMIN — SODIUM CHLORIDE, SODIUM LACTATE, POTASSIUM CHLORIDE, AND CALCIUM CHLORIDE 75 ML/HR: 600; 310; 30; 20 INJECTION, SOLUTION INTRAVENOUS at 08:00

## 2019-03-21 RX ADMIN — PROPOFOL 140 MCG/KG/MIN: 10 INJECTION, EMULSION INTRAVENOUS at 08:08

## 2019-03-21 RX ADMIN — PROPOFOL 60 MG: 10 INJECTION, EMULSION INTRAVENOUS at 08:08

## 2019-03-21 NOTE — PROGRESS NOTES
's pre-procedure visit and prayer with patient as requested.     Laura Richter MDiv, BS  Board Certified

## 2019-03-21 NOTE — ANESTHESIA PREPROCEDURE EVALUATION
Relevant Problems No relevant active problems Anesthetic History No history of anesthetic complications Review of Systems / Medical History Patient summary reviewed and pertinent labs reviewed Pulmonary Within defined limits Neuro/Psych Within defined limits Cardiovascular Exercise tolerance: >4 METS 
  
GI/Hepatic/Renal 
Within defined limits Endo/Other Morbid obesity Other Findings Physical Exam 
 
Airway Mallampati: II 
TM Distance: > 6 cm Neck ROM: normal range of motion Mouth opening: Normal 
 
 Cardiovascular Rhythm: regular Dental 
No notable dental hx Pulmonary Abdominal 
GI exam deferred Other Findings Anesthetic Plan ASA: 3 Induction: Intravenous Anesthetic plan and risks discussed with: Patient

## 2019-03-21 NOTE — ROUTINE PROCESS
PATIENT DISCHARGED HOME WITH FAMILY VIA WHEELCHAIR.  VSS AT DISCHARGE. DISCHARGE INSTRUCTIONS GIVEN TO PATIENT AND FAMILY. WIFE TO MAKE FOLLOW UP APPOINTMENT WHEN HOME.

## 2019-03-21 NOTE — INTERVAL H&P NOTE
H&P Update:  Anna Gage was seen and examined. History and physical has been reviewed. The patient has been examined.  There have been no significant clinical changes since the completion of the originally dated History and Physical.    Signed By: Maryellen Duke MD     March 21, 2019 7:40 AM

## 2019-03-21 NOTE — PROCEDURES
[address], [address] [facility]  [phone]    Colonoscopy Procedure Note    Name: Ruben Randhawa     Date: 3/21/2019  Med Record Number: [id number]   Age: 46 y.o. Sex: male   Procedure: Colonoscopy with polypectomy (cold snare), polypectomy (hot biopsy)  Pre-operative Diagnosis:  Initial screen for colon cancer  Post-operative Diagnosis: Colon polyps (Hepatic Flexure, Splenic Flexure)  Indications: screening for colon cancer  Anesthesia/Sedation: MAC IV MAC anesthesia Propofol  Procedure Details:    Informed consent was obtained for the procedure, including sedation. Risks of perforation, hemorrhage, adverse drug reaction and aspiration were discussed. The patient was placed in the left lateral decubitus position. Based on the pre-procedure assessment, including review of the patient's medical history, medications, allergies, and review of systems, he had been deemed to be an appropriate candidate for sedation by the Anesthesia Dept. The patient was monitored continuously with ECG tracing, pulse oximetry, blood pressure monitoring, and direct observations. A time out was performed. Once sedation was adequate, a rectal examination was performed. The [id number] was inserted into the rectum and advanced under direct vision to the cecum, which was identified by the ileocecal valve and appendiceal orifice. The quality of the colonic preparation was excellent. A careful inspection was made as the colonoscope was withdrawn, including a retroflexed view of the rectum; findings and interventions are described below. Appropriate photodocumentation was obtained. Findings: ANUS: Anal exam reveals no masses or hemorrhoids, sphincter tone is normal.   RECTUM: Rectal exam reveals no masses or hemorrhoids. SIGMOID COLON: The mucosa is normal with good vascular pattern and without ulcers, diverticula, and polyps.    DESCENDING COLON: The mucosa is normal with good vascular pattern and without ulcers, diverticula, and polyps. SPLENIC FLEXURE: The splenic flexure is normal except:  - Protruding lesions: -Pedunculated Polyp(s) size 3 mm removed by polypectomy (hot biopsy)  TRANSVERSE COLON: The mucosa is normal with good vascular pattern and without ulcers, diverticula, and polyps. HEPATIC FLEXURE: The hepatic flexure is normal except:  - Protruding lesions: -Pedunculated Polyp(s) size 5 mm removed by polypectomy (cold snare)  ASCENDING COLON: The mucosa is normal with good vascular pattern and without ulcers, diverticula, and polyps. CECUM: The appendiceal orifice appears normal. The ileocecal valve appears normal.   Specimens: Hepatic flexure polyp, Splenic flexure polyp    Estimated Blood Loss:  0-minimum           Complications: None; patient tolerated the procedure well. Attending Attestation: I performed the procedure. Impression:  See post-procedure diagnoses    Recommendations:-Await pathology. , -Follow up with me., -post polypectomy precautions    Elaina Turcios MD, FACS

## 2019-04-08 PROBLEM — D12.3 ADENOMATOUS POLYP OF TRANSVERSE COLON: Status: ACTIVE | Noted: 2019-04-08

## 2020-08-19 PROBLEM — K43.6 VENTRAL HERNIA WITH OBSTRUCTION: Status: RESOLVED | Noted: 2018-02-09 | Resolved: 2020-08-19

## 2020-08-19 PROBLEM — E66.9 CLASS 2 OBESITY WITH BODY MASS INDEX (BMI) OF 39.0 TO 39.9 IN ADULT: Status: RESOLVED | Noted: 2018-02-09 | Resolved: 2020-08-19

## 2021-08-25 ENCOUNTER — HOSPITAL ENCOUNTER (OUTPATIENT)
Dept: PHYSICAL THERAPY | Age: 51
Discharge: HOME | End: 2021-08-25
Attending: PHYSICIAN ASSISTANT
Payer: COMMERCIAL

## 2021-08-25 DIAGNOSIS — M25.562 LEFT KNEE PAIN, UNSPECIFIED CHRONICITY: ICD-10-CM

## 2021-08-25 PROCEDURE — 97110 THERAPEUTIC EXERCISES: CPT

## 2021-08-25 PROCEDURE — 97161 PT EVAL LOW COMPLEX 20 MIN: CPT

## 2021-08-25 NOTE — PROGRESS NOTES
Manoj Garrido  : 1970  Payor: Osmle Esteban / Plan: [id number] HMO/CHOICE PLUS/POS / Product Type: HMO /  [address]  at [facility]  [address]  [phone]   [phone]      OUTPATIENT PHYSICAL THERAPY: Daily Treatment Note 2021  Visit Count:  1    ICD-10: Treatment Diagnosis:   M25.562 Pain in Left Knee  R26.89 Other Abnormalities of Gait  M25.662 Stiffness Left Knee    Precautions/Allergies:   Patient has no known allergies. TREATMENT PLAN:  Effective Dates: 2021 TO 2021 (90 days). Frequency/Duration: 2 times a week for 90 Days        PRE-TREATMENT SYMPTOMS/COMPLAINTS:  L knee pain    MEDICATIONS REVIEWED:  2021   TREATMENT:   (In addition to Assessment/Re-Assessment sessions the following treatments were rendered)    THERAPEUTIC EXERCISE: (8 minutes):  Exercises per grid below to improve mobility, strength and balance. Required minimal visual and verbal cues to promote proper body alignment and promote proper body posture. Progressed resistance, range and complexity of movement as indicated. Date:  2021 Date:   Date:     Activity/Exercise Parameters Parameters Parameters   Seated HS stretch HEP     Standing gastroc stretch HEP                                     MANUAL THERAPY: (0 minutes): Joint mobilization, Soft tissue mobilization and Manipulation was utilized and necessary because of the patient's restricted joint motion, painful spasm, loss of articular motion and restricted motion of soft tissue. (Used abbreviations: MET - muscle energy technique; PNF - proprioceptive neuromuscular facilitation; NMR - neuromuscular re-education; AP - anterior to posterior; PA - posterior to anterior)    MODALITIES: (0 minutes):      none      TREATMENT/SESSION ASSESSMENT:  Manoj Garrido verbalized understanding of role of PT and POC.     Education: on objective findings and HEP    RECOMMENDATIONS/INTENT FOR NEXT TREATMENT SESSION: \"Next visit will focus on advancements to more challenging activities\".     PAIN: Initial: 1/10 Post Session:  1/10     [facility] Portal    Total Treatment Billable Duration:  PT Patient Time In/Time Out  Time In: 9936  Time Out: 1423  Loretta Dancer, PT, DPT    Future Appointments   Date Time Provider Garth Lincoln   9/2/2021  1:45 PM [facility]   9/7/2021  2:30 PM Tamara Spire A [facility]   9/9/2021  1:45 PM Tamara Spire A [facility]   9/14/2021  1:45 PM Tamara Spire A [facility]   9/16/2021  1:45 PM Tamara Spire A [facility]   10/1/2021  2:00 PM Jevon Teran PA [facility] POA   10/21/2021  3:30 PM Tino Thomas MD [facility] POA       Visit Approval Visit # Therapist initials Date A NS / Cx < 24 hr >24 hr Cx Comments    1 NANCY 8/25/21 [x]  [] [] Initial evaluation       [] [] []        [] [] []        [] [] []        [] [] []        [] [] []        [] [] []        [] [] []        [] [] []        [] [] []        [] [] []        [] [] []        [] [] []        [] [] []        [] [] []        [] [] []        [] [] []        [] [] []

## 2021-08-25 NOTE — THERAPY EVALUATION
Eun Michel  : 1970  Payor: Hyun Markos / Plan: [id number] HMO/CHOICE PLUS/POS / Product Type: HMO /  [address]  at [address] [facility] [address], [address] [facility], [place] [address] [address] [facility]  [phone]   [phone]        OUTPATIENT PHYSICAL THERAPY:Initial Assessment 2021    ICD-10: Treatment Diagnosis:   M25.562 Pain in Left Knee  R26.89 Other Abnormalities of Gait  M25.662 Stiffness Left Knee  Precautions/Allergies:   Patient has no known allergies. Fall Risk Score: 2 (? 5 = High Risk)  MD Orders: Eval and Treat MEDICAL/REFERRING DIAGNOSIS:  Left knee pain, unspecified chronicity [M25.562]   DATE OF ONSET: 2021  REFERRING PHYSICIAN: Sandee Paredes  RETURN PHYSICIAN APPOINTMENT: TBD by pt     ASSESSMENT:  Mr. Gael Al has attended 1 physical therapy session including the initial evaluation. Pt presents with c/o L knee pain with meniscus tear noted on MRI. Pt presents with decreased knee flexion ROM, decreased strength, and increased pain affecting participation in ADLs and functional mobility at this time. Recommending skilled PT: manual therapeutic techniques (as appropriate), therapeutic exercises and activities, balance interventions, and a comprehensive home exercise program to address the current impairments, as listed above. Eun Michel will benefit from skilled PT (medically necessary) to address above deficits affecting participation in basic ADLs and overall functional tolerance. PROBLEM LIST (Impacting functional limitations)  1. Decreased Strength  2. Decreased ADL/Functional Activities  3. Decreased Transfer Abilities  4. Decreased Ambulation Ability/Technique  5. Decreased Balance  6. Increased Pain  7. Decreased Flexibility/Joint Mobility  8. Decreased [place] with Home Exercise Program INTERVENTIONS PLANNED:  1. Balance Exercise  2. Cold  3. Cryotherapy  4. Electrical Stimulation  5.  Family Education  6. Gait Training  7. Heat  8. Home Exercise Program (HEP)  9. Manual Therapy  10. Neuromuscular Re-education/Strengthening  11. Range of Motion (ROM)  12. Therapeutic Activites  13. Therapeutic Exercise/Strengthening  14. Transcutaneous Electrical Nerve Stimulation (TENS)  15. Transfer Training  16. Vasopneumatic Device  17. Dry Needling   TREATMENT PLAN:  TREATMENT PLAN:  Effective Dates: 8/25/2021 TO 11/23/2021 (90 days). Frequency/Duration: 2 times a week for 90 Days  GOALS: (Goals have been discussed and agreed upon with patient.)  Discharge Goals: Time Frame: 8 weeks  1. Juwan Quintanilla will be independent with HEP. 2. Juwan Quintanilla will report 0/10 pain in order to tolerate walking for 8 hour work shift without difficulty  3. Juwan Quintanilla will demonstrate 0-130 degrees knee ROM in order to ascend/descend steps without difficulty in community  4. Juwan Quintanilla will demonstrate 5/5 LE strength in order to return walking without increased pain or swelling  1. Juwan Quintanilla will maintain SLS for up to 30 seconds in order to reduce falls risk  1. Juwan Quintanilla will score 75/80 on LEFS demonstrating overall improvements in functional mobility  Rehabilitation Potential For Stated Goals: Good    Outcome Measure: Tool Used: Lower Extremity Functional Scale (LEFS)  Score:  Initial: 61/80 Most Recent: X/80 (Date: -- )   Interpretation of Score: 20 questions each scored on a 5 point scale with 0 representing \"extreme difficulty or unable to perform\" and 4 representing \"no difficulty\". The lower the score, the greater the functional disability. 80/80 represents no disability. Minimal detectable change is 9 points. Medical Necessity:   · Skilled intervention continues to be required due to decreased strength, ROM, balance, and functional mobility.   Reason for Services/Other Comments:  · Patient continues to require skilled intervention due to decreased strength, balance, and ROM with increased pain affecting pt functional mobility. Regarding Joseph Garibay's therapy, I certify that the treatment plan above will be carried out by a therapist or under their direction. Thank you for this referral,  London Wall , PT, DPT  Referring Physician Signature: JOHAN Gonzalez              Date                    The information in this section was collected on 8/25/21 (except where otherwise noted). HISTORY:   History of Present Injury/Illness (Reason for Referral):  Pt report around the 4th of July he woke up the next morning with knee pain and inability to move the knee. He states he does not recall any specific MARGIE. Pt reports he recently got an injection and is not having more discomfort than painful now. Pt feels some discomfort with turning and turning while walking. Pt having some pain at the back of thigh behind the knee. Pt reports increased pain and discomfort with longer walking distances    Per referring PA:  3-week history of left knee pain. Patient denies known mechanism of injury. But notes began hurting him around 4 July. He notes swelling especially located in the posterior aspect of the knee. He does note a locking and catching especially in the middle the night. This does interfere with sleep. He went to the urgent care where they took radiographs gave him home exercise program and also anti-inflammatories which failed to give relief. The patient notes worsening symptoms with activity. No prior history of surgical intervention or knee injury. CC/Primary Concern: L knee pain            Treatment Side: Left    Past Medical History/Comorbidities:   Mr. Kimberli Rodriguez  has no past medical history of Adverse effect of anesthesia, Difficult intubation, Malignant hyperthermia due to anesthesia, Nausea & vomiting, or Pseudocholinesterase deficiency.   Mr. Kimberli Rodriguez  has a past surgical history that includes hx cyst removal; colonoscopy,benjamin perkins,snare (3/21/2019); colonoscopy,benjamin perkins,forcep/cautery (3/21/2019); and colonoscopy (N/A, 3/21/2019). Social History/Living Environment:   Lives with wife, 1 story, 4 steps     Pain/Symptom Location: posterior aspect of patella, behind knee    Worst Pain: 3/10  Current Pain: 1/10  Best Pain: 0/10    Aggravating Factors: Standing, Walking, Twisting, Sit to stand and Stand to sit    Alleviating Factors/Positions/Motions: I haven't really tried anything, rest      Diagnostic Imaging:   MRI     IMPRESSION  1. Radial tear through the central posterior horn of the medial meniscus. 2. Superficial chondral fissure along the central aspect of the medial femoral  condyle articular cartilage. 3. Small joint effusion. 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 Date Last Reviewed:  8/25/2021       Ambulatory/Rehab Services H2 Model Falls Risk Assessment    Risk Factors:       (1)  Gender [Male] Ability to Rise from Chair:       (1)  Pushes up, successful in one attempt    Falls Prevention Plan:       No modifications necessary   Total: (5 or greater = High Risk): 2    ©2010 [facility]. All Rights Reserved. [facility] Patent #[id number].  Federal Law prohibits the replication, distribution or use without written permission from [employer]        Number of Personal Factors/Comorbidities that affect the Plan of Care: 1-2: MODERATE COMPLEXITY   EXAMINATION:   Inspection          Additional Comments:   ________________________________________________________________________________________________  Observation        LE Structure:        Right: slight ER        Left: slight ER        Gait: slight antalgic        Assistive Device: none                  Edema: 43 cm on L, 42 cm on R, increased at posterior aspect of knee               Addition Comments:     ________________________________________________________________________________________________  Range of Motion            Lower  Joint: Passive Passive Active Active    Right (Degrees) Left (Degrees) Right (Degrees) Left (Degrees)   Hip Flexion       Hip Extension       Hip Adduction       Hip Abduction       Hip Internal Rotation        Hip External Rotation       Knee Flexion   135 degrees 120 degrees   Knee Extension   0 degrees  0 degrees           Additional Comments:   ________________________________________________________________________________________________  Strength                 Lower Extremity  Joint:      RIGHT LEFT   Hip Flexion 5/5 4+/5 (p)   Hip Extension 5/5 4+/5   Hip Internal Rotation 5/5 5/5   Hip External Rotation 5/5 4/5 (p)   Hip Abduction 5/5 4+/5   Hip Adduction NT/5 NT/5   Knee Flexion 5/5 4+/5   Knee Extension 5/5 4+/5        Additional Comments:   ________________________________________________________________________________________________  [facility]        C/O Radicular Symptoms: No            Additional Comments:   ________________________________________________________________________________________________  ______________________________________________________________________________  Palpation: Distal HS  Joint mobilization:  Special Test: Maggie + L  Tap down: compensations noted with L (valgus and generalized weakness noted)     Body Structures Involved:    1. Nerves  2. Bones  3. Joints  4. Muscles  5. Ligaments Body Functions Affected:  1. Sensory/Pain  2. Neuromusculoskeletal  3. Movement Related Activities and Participation Affected:  1. General Tasks and Demands  2. Mobility  3. Self Care  4. Domestic Life  5. Interpersonal Interactions and Relationships  6.  Community, Social and [place]   Number of elements (examined above) that affect the Plan of Care: 4+: HIGH COMPLEXITY   CLINICAL PRESENTATION:   Presentation: Evolving clinical presentation with changing clinical characteristics: MODERATE COMPLEXITY   CLINICAL DECISION MAKING:      Use of outcome tool(s) and clinical judgement create a POC that gives a: Clear prediction of patient's progress: LOW COMPLEXITY

## 2021-09-02 ENCOUNTER — HOSPITAL ENCOUNTER (OUTPATIENT)
Dept: PHYSICAL THERAPY | Age: 51
Discharge: HOME | End: 2021-09-02
Attending: PHYSICIAN ASSISTANT
Payer: COMMERCIAL

## 2021-09-02 NOTE — PROGRESS NOTES
CANCELLATION NOTE    Mr. Lubna Montana was a same-day cancellation for today's appointment due to family issue.     # Recent No Shows/Same-Day Cancellations: 1    9/2/2021  Roberta Duke

## 2021-09-07 ENCOUNTER — HOSPITAL ENCOUNTER (OUTPATIENT)
Dept: PHYSICAL THERAPY | Age: 51
Discharge: HOME | End: 2021-09-07
Attending: PHYSICIAN ASSISTANT
Payer: COMMERCIAL

## 2021-09-07 PROCEDURE — 97140 MANUAL THERAPY 1/> REGIONS: CPT

## 2021-09-07 PROCEDURE — 97110 THERAPEUTIC EXERCISES: CPT

## 2021-09-07 NOTE — PROGRESS NOTES
Amor Martinez  : 1970  Payor: Miri Brown / Plan: [address] HMO/CHOICE PLUS/POS / Product Type: HMO /  [address]  at [facility]  [address], [address] [facility], [place] [address] [address] [facility]  [phone]   [phone]      OUTPATIENT PHYSICAL THERAPY: Daily Treatment Note 2021  Visit Count:  2    ICD-10: Treatment Diagnosis:   M25.562 Pain in Left Knee  R26.89 Other Abnormalities of Gait  M25.662 Stiffness Left Knee    Precautions/Allergies:   Patient has no known allergies. TREATMENT PLAN:  Effective Dates: 2021 TO 2021 (90 days). Frequency/Duration: 2 times a week for 90 Days        PRE-TREATMENT SYMPTOMS/COMPLAINTS:  Pt reports this is the worst his knee has felt but this is also the 1st day he has returned to work since [address]:  2021   TREATMENT:   (In addition to Assessment/Re-Assessment sessions the following treatments were rendered)    THERAPEUTIC EXERCISE: (25 minutes):  Exercises per grid below to improve mobility, strength and balance. Required minimal visual and verbal cues to promote proper body alignment and promote proper body posture. Progressed resistance, range and complexity of movement as indicated.      Date:  2021 Date:  21 Date:     Activity/Exercise Parameters Parameters Parameters   Seated HS stretch HEP     Standing gastroc stretch HEP     bridge  10 X 2    ASLR  10 X 2 L    Reverse clamshell  10 X 2 L     clamshell  10 X 2 L    S/l hip abduction  10 X 2 L    standing HS curl  10 X 2 L     Tap downs  4\" 10 X 2 L    Lateral tap downs  4\" 10 X 1 L    SLS  FOAM 2 X     Slant board stretch  30 seconds 3 X       MANUAL THERAPY: (15 minutes): Joint mobilization, Soft tissue mobilization and Manipulation was utilized and necessary because of the patient's restricted joint motion, painful spasm, loss of articular motion and restricted motion of soft tissue.   +manual stretching L HS, ITB, hip flexor, quad  +STM medial and lateral retinaculum     (Used abbreviations: MET - muscle energy technique; PNF - proprioceptive neuromuscular facilitation; NMR - neuromuscular re-education; AP - anterior to posterior; PA - posterior to anterior)    MODALITIES: (0 minutes):      none      TREATMENT/SESSION ASSESSMENT: Pt with most TTP along medial knee joint line this session. Increased pain at distal HS with knee extension on mat. Tolerated addition of exercises well requiring VC and TC for technique. Education: on objective findings and HEP    RECOMMENDATIONS/INTENT FOR NEXT TREATMENT SESSION: \"Next visit will focus on advancements to more challenging activities\".     PAIN: Initial: 4/10 Post Session:  4-5/10     [facility] Portal    Total Treatment Billable Duration:  PT Patient Time In/Time Out  Time In: 1430  Time Out: 12  Latha Courser, PT, DPT    Future Appointments   Date Time Provider Garth Lincoln   9/9/2021  1:45 PM David Heard [facility]   9/14/2021  1:45 PM Jess WADEDOALIYAH [facility]   9/16/2021  1:45 PM Jess HANLEY SFDORPT D   10/1/2021  2:00 PM Tessie Teran PA [facility] POAI POA   10/21/2021  3:30 PM Justo Cuenca MD [facility] POA       Visit Approval Visit # Therapist initials Date A NS / Cx < 24 hr >24 hr Cx Comments    1 NANCY 8/25/21 [x]  [] [] Initial evaluation     NANCY 9/2/21 [] [x] [] Due to family issue    2 NANCY 9/7/21 [x] [] []        [] [] []        [] [] []        [] [] []        [] [] []        [] [] []        [] [] []        [] [] []        [] [] []        [] [] []        [] [] []        [] [] []        [] [] []        [] [] []        [] [] []        [] [] []

## 2021-09-09 ENCOUNTER — HOSPITAL ENCOUNTER (OUTPATIENT)
Dept: PHYSICAL THERAPY | Age: 51
Discharge: HOME | End: 2021-09-09
Attending: PHYSICIAN ASSISTANT
Payer: COMMERCIAL

## 2021-09-09 PROCEDURE — 97110 THERAPEUTIC EXERCISES: CPT

## 2021-09-09 PROCEDURE — 97140 MANUAL THERAPY 1/> REGIONS: CPT

## 2021-09-09 NOTE — PROGRESS NOTES
Shreyas Light  : 1970  Payor: Gus Rhonda / Plan: [id number] HMO/CHOICE PLUS/POS / Product Type: HMO /  [address] [place]  at [facility]  [address]", [place] [address] [address] [facility]  [phone]   [phone]      OUTPATIENT PHYSICAL THERAPY: Daily Treatment Note 2021  Visit Count:  3    ICD-10: Treatment Diagnosis:   M25.562 Pain in Left Knee  R26.89 Other Abnormalities of Gait  M25.662 Stiffness Left Knee    Precautions/Allergies:   Patient has no known allergies. TREATMENT PLAN:  Effective Dates: 2021 TO 2021 (90 days). Frequency/Duration: 2 times a week for 90 Days        PRE-TREATMENT SYMPTOMS/COMPLAINTS:  Pt reports no increased pain after last session. Reports knee is feeling slightly better. Pt reports no increased pain at work with 10 hour shift but no improvement either. MEDICATIONS REVIEWED:  2021   TREATMENT:   (In addition to Assessment/Re-Assessment sessions the following treatments were rendered)    THERAPEUTIC EXERCISE: (25 minutes):  Exercises per grid below to improve mobility, strength and balance. Required minimal visual and verbal cues to promote proper body alignment and promote proper body posture. Progressed resistance, range and complexity of movement as indicated.      Date:  2021 Date:  21 Date:  21   Activity/Exercise Parameters Parameters Parameters   Seated HS stretch HEP     Standing gastroc stretch HEP     bridge  10 X 2 15 X 2    ASLR  10 X 2 L 10 X 2 L    Reverse clamshell  10 X 2 L  #3 10 X 2 L   clamshell  10 X 2 L GTB 10 X 2 L   S/l hip abduction  10 X 2 L #3 10 X 2L    standing HS curl  10 X 2 L  #3 10 X 2L   Tap downs  4\" 10 X 2 L    Lateral tap downs  4\" 10 X 1 L    SLS  FOAM 2 X     Slant board stretch  30 seconds 3 X     SAQ   #3 10 X 2   LAQ   #3 10 X 2     MANUAL THERAPY: (15 minutes): Joint mobilization, Soft tissue mobilization and Manipulation was utilized and necessary because of the patient's restricted joint motion, painful spasm, loss of articular motion and restricted motion of soft tissue.   +manual stretching L HS, ITB, hip flexor, quad  +STM medial and lateral retinaculum     (Used abbreviations: MET - muscle energy technique; PNF - proprioceptive neuromuscular facilitation; NMR - neuromuscular re-education; AP - anterior to posterior; PA - posterior to anterior)    MODALITIES: (0 minutes):      none      TREATMENT/SESSION ASSESSMENT: Pt continues with most TTP along medial knee joint line this session. Tolerated progression of exercises well no discomfort or pain with any exercises except step exercises. Education: on objective findings and HEP    RECOMMENDATIONS/INTENT FOR NEXT TREATMENT SESSION: \"Next visit will focus on advancements to more challenging activities\". Provide handout of exercises for HEP next visit.     PAIN: Initial: 1/10 Post Session:  1/10     [employer] on Request Portal    Total Treatment Billable Duration:  PT Patient Time In/Time Out  Time In: 3372  Time Out: 1425  Castro Boys, PT, DPT    Future Appointments   Date Time Provider Garth Fofanai   9/14/2021  1:45 PM [facility]   9/16/2021  1:45 PM Soledad HANLEY SFDORPT SFD   10/1/2021  2:00 PM Philipp Teran PA [facility] PO POA   10/21/2021  3:30 PM Omar Crystal MD [facility] POA       Visit Approval Visit # Therapist initials Date A NS / Cx < 24 hr >24 hr Cx Comments    1 NANCY 8/25/21 [x]  [] [] Initial evaluation     NANCY 9/2/21 [] [x] [] Due to family issue    2 NANCY 9/7/21 [x] [] []     3 NANCY 9/9/21 [x] [] []        [] [] []        [] [] []        [] [] []        [] [] []        [] [] []        [] [] []        [] [] []        [] [] []        [] [] []        [] [] []        [] [] []        [] [] []        [] [] []        [] [] []

## 2021-09-14 ENCOUNTER — HOSPITAL ENCOUNTER (OUTPATIENT)
Dept: PHYSICAL THERAPY | Age: 51
Discharge: HOME | End: 2021-09-14
Attending: PHYSICIAN ASSISTANT
Payer: COMMERCIAL

## 2021-09-14 PROCEDURE — 97110 THERAPEUTIC EXERCISES: CPT

## 2021-09-14 PROCEDURE — 97140 MANUAL THERAPY 1/> REGIONS: CPT

## 2021-09-14 NOTE — PROGRESS NOTES
Sintia Ching  : 1970  Payor: Senia Messer / Plan: [address] HMO/CHOICE PLUS/POS / Product Type: HMO /  [address]  at [address] [facility] [address], [address], [place], [address] [facility]  [phone]   [phone]      OUTPATIENT PHYSICAL THERAPY: Daily Treatment Note 2021  Visit Count:  4    ICD-10: Treatment Diagnosis:   M25.562 Pain in Left Knee  R26.89 Other Abnormalities of Gait  M25.662 Stiffness Left Knee    Precautions/Allergies:   Patient has no known allergies. TREATMENT PLAN:  Effective Dates: 2021 TO 2021 (90 days). Frequency/Duration: 2 times a week for 90 Days        PRE-TREATMENT SYMPTOMS/COMPLAINTS:  Pt reports no increased pain after last session. Reports knee is feeling slightly better. Pt reports no increased pain at work with 10 hour shift but no improvement either. MEDICATIONS REVIEWED:  2021   TREATMENT:   (In addition to Assessment/Re-Assessment sessions the following treatments were rendered)    THERAPEUTIC EXERCISE: (25 minutes):  Exercises per grid below to improve mobility, strength and balance. Required minimal visual and verbal cues to promote proper body alignment and promote proper body posture. Progressed resistance, range and complexity of movement as indicated.      Date:  2021 Date:  21 Date:  21 Date:  21   Activity/Exercise Parameters Parameters Parameters    Seated HS stretch HEP      Standing gastroc stretch HEP      bridge  10 X 2 15 X 2  30 X    ASLR  10 X 2 L 10 X 2 L  #2 10 X 2 L    Reverse clamshell  10 X 2 L  #3 10 X 2 L #3 10 X 2 L   clamshell  10 X 2 L GTB 10 X 2 L GTB 10 X 2 L    S/l hip abduction  10 X 2 L #3 10 X 2L  #3 10 X 2 L    standing HS curl  10 X 2 L  #3 10 X 2L #3 10 X 2 L    Tap downs  4\" 10 X 2 L  4\" 10 X 1   Lateral tap downs  4\" 10 X 1 L  4\" 10 X 1   SLS  FOAM 2 X   FOAM 2 X    Slant board stretch  30 seconds 3 X   30 seconds 3 X    SAQ   #3 10 X 2 #4 10 X 2   LAQ   #3 10 X 2 #3 10 X 2   Leg press    #35 SL 10 X 2     MANUAL THERAPY: (15 minutes): Joint mobilization, Soft tissue mobilization and Manipulation was utilized and necessary because of the patient's restricted joint motion, painful spasm, loss of articular motion and restricted motion of soft tissue.   +manual stretching L HS, ITB, hip flexor, quad  +STM medial and lateral retinaculum     (Used abbreviations: MET - muscle energy technique; PNF - proprioceptive neuromuscular facilitation; NMR - neuromuscular re-education; AP - anterior to posterior; PA - posterior to anterior)    MODALITIES: (0 minutes):      none      TREATMENT/SESSION ASSESSMENT: Pt continues with most TTP along medial knee joint line. Tolerated progression of exercises well. Most pain with eccentric knee exercises   Education: on objective findings and HEP    RECOMMENDATIONS/INTENT FOR NEXT TREATMENT SESSION: \"Next visit will focus on advancements to more challenging activities\". Provide handout of exercises for HEP next visit.     PAIN: Initial: 1/10 Post Session:  1/10     [facility] Portal    Total Treatment Billable Duration:  PT Patient Time In/Time Out  Time In: 2062  Time Out: 1425  Jcarlos Leon, PT, DPT    Future Appointments   Date Time Provider Garth Lincoln   9/16/2021  1:45 PM [facility] SFD   10/1/2021  2:00 PM JOHAN Lopez [facility] POAI POA   10/21/2021  3:30 PM Chantel Lockwood MD [facility] POA       Visit Approval Visit # Therapist initials Date A NS / Cx < 24 hr >24 hr Cx Comments    1 NANCY 8/25/21 [x]  [] [] Initial evaluation     NANCY 9/2/21 [] [x] [] Due to family issue    2 NANCY 9/7/21 [x] [] []     3 NACNY 9/9/21 [x] [] []     4 NANCY 9/14/21 [] [] []        [] [] []        [] [] []        [] [] []        [] [] []        [] [] []        [] [] []        [] [] []        [] [] []        [] [] []        [] [] []        [] [] []        [] [] []        [] [] []

## 2021-09-16 ENCOUNTER — HOSPITAL ENCOUNTER (OUTPATIENT)
Dept: PHYSICAL THERAPY | Age: 51
Discharge: HOME | End: 2021-09-16
Attending: PHYSICIAN ASSISTANT
Payer: COMMERCIAL

## 2021-09-16 PROCEDURE — 97110 THERAPEUTIC EXERCISES: CPT

## 2021-09-16 PROCEDURE — 97140 MANUAL THERAPY 1/> REGIONS: CPT

## 2021-09-16 NOTE — PROGRESS NOTES
Valentine Garcia  : 1970  Payor: Ricardo Mccormickor / Plan: [id number] HMO/CHOICE PLUS/POS / Product Type: HMO /  [address]  at [facility]  [address] [facility], [place] [address] [facility]  [phone]   [phone]      OUTPATIENT PHYSICAL THERAPY: Daily Treatment Note 2021  Visit Count:  5    ICD-10: Treatment Diagnosis:   M25.562 Pain in Left Knee  R26.89 Other Abnormalities of Gait  M25.662 Stiffness Left Knee    Precautions/Allergies:   Patient has no known allergies. TREATMENT PLAN:  Effective Dates: 2021 TO 2021 (90 days). Frequency/Duration: 2 times a week for 90 Days        PRE-TREATMENT SYMPTOMS/COMPLAINTS:  Pt reports no increased pain after last session. Reports knee is feeling slightly better. Pt reports no increased pain at work with 10 hour shift but no improvement either. MEDICATIONS REVIEWED:  2021   TREATMENT:   (In addition to Assessment/Re-Assessment sessions the following treatments were rendered)    THERAPEUTIC EXERCISE: (25 minutes):  Exercises per grid below to improve mobility, strength and balance. Required minimal visual and verbal cues to promote proper body alignment and promote proper body posture. Progressed resistance, range and complexity of movement as indicated.      Date:  2021 Date:  21 Date:  21 Date:  21 Date:  21   Activity/Exercise Parameters Parameters Parameters     Seated HS stretch HEP       Standing gastroc stretch HEP       bridge  10 X 2 15 X 2  30 X  SB 20 X    ASLR  10 X 2 L 10 X 2 L  #2 10 X 2 L  #2 10 X 2 L    Reverse clamshell  10 X 2 L  #3 10 X 2 L #3 10 X 2 L #3 10 X 2 L    clamshell  10 X 2 L GTB 10 X 2 L GTB 10 X 2 L  BTB 10 X 2   S/l hip abduction  10 X 2 L #3 10 X 2L  #3 10 X 2 L  #3 10 X 2 L    standing HS curl  10 X 2 L  #3 10 X 2L #3 10 X 2 L  #3 10 X 2 L   Tap downs  4\" 10 X 2 L  4\" 10 X 1 4\" 10 X 1   Lateral tap downs  4\" 10 X 1 L  4\" 10 X 1 4\" 10 x 1   SLS  FOAM 2 X   FOAM 2 X  FOAM 2 X    Slant board stretch  30 seconds 3 X   30 seconds 3 X  30 seconds 3 X    SAQ   #3 10 X 2 #4 10 X 2 #4 10 X 2   LAQ   #3 10 X 2 #3 10 X 2 #3 10 X 2   Leg press    #35 SL 10 X 2 #35 SL 10 X 2     MANUAL THERAPY: (14 minutes): Joint mobilization, Soft tissue mobilization and Manipulation was utilized and necessary because of the patient's restricted joint motion, painful spasm, loss of articular motion and restricted motion of soft tissue.   +manual stretching L HS, ITB, hip flexor, quad  +STM medial and lateral retinaculum     (Used abbreviations: MET - muscle energy technique; PNF - proprioceptive neuromuscular facilitation; NMR - neuromuscular re-education; AP - anterior to posterior; PA - posterior to anterior)    MODALITIES: (0 minutes):      none      TREATMENT/SESSION ASSESSMENT: Pt continues with most TTP along medial knee joint line. Tolerated progression of exercises well. Pt taking 2 weeks off to assess need to continue or if can just continue at home with HEP     Education: on HEP handout and current exercises. RECOMMENDATIONS/INTENT FOR NEXT TREATMENT SESSION: \"Next visit will focus on advancements to more challenging activities\".  Leave case open for 2 weeks to assess need to continue per pt request    PAIN: Initial: 1/10 Post Session:  1/10     [facility] Portal    Total Treatment Billable Duration:  PT Patient Time In/Time Out  Time In: 4580  Time Out: [address] [facility] Jason, PT, DPT    Future Appointments   Date Time Provider Garth Lincoln   10/1/2021  2:00 PM Melissa [facility] Christin Ventura, [address] [place] POA   10/21/2021  3:30 PM Dean Medrano MD [facility] POA       Visit Approval Visit # Therapist initials Date A NS / Cx < 24 hr >24 hr Cx Comments    1 NANCY 8/25/21 [x]  [] [] Initial evaluation     NANCY 9/2/21 [] [x] [] Due to family issue    2 NANCY 9/7/21 [x] [] []     3 NANCY 9/9/21 [x] [] []     4 NANCY 9/14/21 [x] [] []     5 NANCY 9/16/21 [x] [] []        [] [] [] [] [] []        [] [] []        [] [] []        [] [] []        [] [] []        [] [] []        [] [] []        [] [] []        [] [] []        [] [] []        [] [] []

## 2021-10-13 NOTE — THERAPY DISCHARGE
Bia Solis  : 1970  Payor: [employer] / Plan: [address] HMO/CHOICE PLUS/POS / Product Type: HMO /  [address]  at [facility]  [address] [facility], [address] [place] [address]  [phone]   [phone]        OUTPATIENT PHYSICAL THERAPY:Discontinuation Summary 10/13/2021    ICD-10: Treatment Diagnosis:   M25.562 Pain in Left Knee  R26.89 Other Abnormalities of Gait  M25.662 Stiffness Left Knee  Precautions/Allergies:   Patient has no known allergies. Fall Risk Score: 2 (? 5 = High Risk)  MD Orders: Eval and Treat MEDICAL/REFERRING DIAGNOSIS:  Left knee pain, unspecified chronicity [M25.562]   DATE OF ONSET: 2021  REFERRING PHYSICIAN: Sandee Julian  RETURN PHYSICIAN APPOINTMENT: TBD by pt   Discontinuation Summary:  Pt had been seen for a total of 5 PT sessions to date with last session being on 21. Pt at that time was making excellent progress finding fair relief with PT. Pt requested to take 2 weeks off to assess need to continue and has not returned at this time. Will discharge case. ASSESSMENT:  Mr. Josy Barrett has attended 1 physical therapy session including the initial evaluation. Pt presents with c/o L knee pain with meniscus tear noted on MRI. Pt presents with decreased knee flexion ROM, decreased strength, and increased pain affecting participation in ADLs and functional mobility at this time. Recommending skilled PT: manual therapeutic techniques (as appropriate), therapeutic exercises and activities, balance interventions, and a comprehensive home exercise program to address the current impairments, as listed above. Bia Solis will benefit from skilled PT (medically necessary) to address above deficits affecting participation in basic ADLs and overall functional tolerance. PROBLEM LIST (Impacting functional limitations)  1. Decreased Strength  2.  Decreased ADL/Functional Activities  3. Decreased Transfer Abilities  4. Decreased Ambulation Ability/Technique  5. Decreased Balance  6. Increased Pain  7. Decreased Flexibility/Joint Mobility  8. Decreased Kidder with Home Exercise Program INTERVENTIONS PLANNED:  1. Balance Exercise  2. Cold  3. Cryotherapy  4. Electrical Stimulation  5. Family Education  6. Gait Training  7. Heat  8. Home Exercise Program (HEP)  9. Manual Therapy  10. Neuromuscular Re-education/Strengthening  11. Range of Motion (ROM)  12. Therapeutic Activites  13. Therapeutic Exercise/Strengthening  14. Transcutaneous Electrical Nerve Stimulation (TENS)  15. Transfer Training  16. Vasopneumatic Device  17. Dry Needling   TREATMENT PLAN:  TREATMENT PLAN:  Effective Dates: 8/25/2021 TO 11/23/2021 (90 days). Frequency/Duration: 2 times a week for 90 Days  GOALS: (Goals have been discussed and agreed upon with patient.)  Discharge Goals: Time Frame: 8 weeks  1. Verline Erb will be independent with HEP. 2. Verline Erb will report 0/10 pain in order to tolerate walking for 8 hour work shift without difficulty  3. Verline Erb will demonstrate 0-130 degrees knee ROM in order to ascend/descend steps without difficulty in community  4. Verline Erb will demonstrate 5/5 LE strength in order to return walking without increased pain or swelling  1. Verline Erb will maintain SLS for up to 30 seconds in order to reduce falls risk  1. Verline Erb will score 75/80 on LEFS demonstrating overall improvements in functional mobility  Rehabilitation Potential For Stated Goals: Good    Outcome Measure: Tool Used: Lower Extremity Functional Scale (LEFS)  Score:  Initial: 61/80 Most Recent: X/80 (Date: -- )   Interpretation of Score: 20 questions each scored on a 5 point scale with 0 representing \"extreme difficulty or unable to perform\" and 4 representing \"no difficulty\".   The lower the score, the greater the functional disability. 80/80 represents no disability. Minimal detectable change is 9 points. Regarding Bernice Garibay's therapy, I certify that the treatment plan above will be carried out by a therapist or under their direction. Thank you for this referral,  Isis Pham , PT, DPT  Referring Physician Signature: JOHAN Wylie             The information in this section was collected on 8/25/21 (except where otherwise noted). HISTORY:   History of Present Injury/Illness (Reason for Referral):  Pt report around the 4th of July he woke up the next morning with knee pain and inability to move the knee. He states he does not recall any specific MARGIE. Pt reports he recently got an injection and is not having more discomfort than painful now. Pt feels some discomfort with turning and turning while walking. Pt having some pain at the back of thigh behind the knee. Pt reports increased pain and discomfort with longer walking distances    Per referring PA:  3-week history of left knee pain. Patient denies known mechanism of injury. But notes began hurting him around 4 July. He notes swelling especially located in the posterior aspect of the knee. He does note a locking and catching especially in the middle the night. This does interfere with sleep. He went to the urgent care where they took radiographs gave him home exercise program and also anti-inflammatories which failed to give relief. The patient notes worsening symptoms with activity. No prior history of surgical intervention or knee injury. CC/Primary Concern: L knee pain            Treatment Side: Left    Past Medical History/Comorbidities:   Mr. Shazia Johnson  has no past medical history of Adverse effect of anesthesia, Difficult intubation, Malignant hyperthermia due to anesthesia, Nausea & vomiting, or Pseudocholinesterase deficiency.   Mr. Shazia Johnson  has a past surgical history that includes hx cyst removal; colonoscopy,remv claribel perkins (3/21/2019); colonoscopy,juan escobar/cauterdequan (3/21/2019); and colonoscopy (N/A, 3/21/2019). Social History/Living Environment:   Lives with wife, 1 story, 4 steps     Pain/Symptom Location: posterior aspect of patella, behind knee    Worst Pain: 3/10  Current Pain: 1/10  Best Pain: 0/10    Aggravating Factors: Standing, Walking, Twisting, Sit to stand and Stand to sit    Alleviating Factors/Positions/Motions: I haven't really tried anything, rest      Diagnostic Imaging:   MRI     IMPRESSION  1. Radial tear through the central posterior horn of the medial meniscus. 2. Superficial chondral fissure along the central aspect of the medial femoral  condyle articular cartilage. 3. Small joint effusion. 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 Date Last Reviewed:  9/16/2021       Ambulatory/Rehab Services H2 Model Falls Risk Assessment    Risk Factors:       (1)  Gender [Male] Ability to Rise from Chair:       (1)  Pushes up, successful in one attempt    Falls Prevention Plan:       No modifications necessary   Total: (5 or greater = High Risk): 2    ©2010 [facility]. All Rights Reserved. [facility] States Patent #[id number].  Federal Law prohibits the replication, distribution or use without written permission from [facility] [employer]        Number of Personal Factors/Comorbidities that affect the Plan of Care: 1-2: MODERATE COMPLEXITY   EXAMINATION:   Inspection          Additional Comments:   ________________________________________________________________________________________________  Observation        LE Structure:        Right: slight ER        Left: slight ER        Gait: slight antalgic        Assistive Device: none Edema: 43 cm on L, 42 cm on R, increased at posterior aspect of knee               Addition Comments:     ________________________________________________________________________________________________  Range of Motion            Lower  Joint: Passive Passive Active Active    Right (Degrees) Left (Degrees) Right (Degrees) Left (Degrees)   Hip Flexion       Hip Extension       Hip Adduction       Hip Abduction       Hip Internal Rotation        Hip External Rotation       Knee Flexion   135 degrees 120 degrees   Knee Extension   0 degrees  0 degrees           Additional Comments:   ________________________________________________________________________________________________  Strength                 Lower Extremity  Joint:      RIGHT LEFT   Hip Flexion 5/5 4+/5 (p)   Hip Extension 5/5 4+/5   Hip Internal Rotation 5/5 5/5   Hip External Rotation 5/5 4/5 (p)   Hip Abduction 5/5 4+/5   Hip Adduction NT/5 NT/5   Knee Flexion 5/5 4+/5   Knee Extension 5/5 4+/5        Additional Comments:   ________________________________________________________________________________________________  [facility]        C/O Radicular Symptoms: No            Additional Comments:   ________________________________________________________________________________________________  ______________________________________________________________________________  Palpation: Distal HS  Joint mobilization:  Special Test: Maggie + L  Tap down: compensations noted with L (valgus and generalized weakness noted)     Body Structures Involved:    1. Nerves  2. Bones  3. Joints  4. Muscles  5. Ligaments Body Functions Affected:  1. Sensory/Pain  2. Neuromusculoskeletal  3. Movement Related Activities and Participation Affected:  1. General Tasks and Demands  2. Mobility  3. Self Care  4. Domestic Life  5. Interpersonal Interactions and Relationships  6.  Community, Social and [place]   Number of elements (examined above) that affect the Plan of Care: 4+: HIGH COMPLEXITY   CLINICAL PRESENTATION:   Presentation: Evolving clinical presentation with changing clinical characteristics: MODERATE COMPLEXITY   CLINICAL DECISION MAKING:      Use of outcome tool(s) and clinical judgement create a POC that gives a: Clear prediction of patient's progress: LOW COMPLEXITY

## 2022-03-19 PROBLEM — D12.3 ADENOMATOUS POLYP OF TRANSVERSE COLON: Status: ACTIVE | Noted: 2019-04-08

## 2022-03-19 PROBLEM — Z98.890 S/P EXCISION OF LIPOMA: Status: ACTIVE | Noted: 2018-04-05

## 2022-03-19 PROBLEM — E66.01 OBESITY, MORBID (HCC): Status: ACTIVE | Noted: 2018-04-05

## 2022-03-19 PROBLEM — M62.08 RECTUS DIASTASIS: Status: ACTIVE | Noted: 2018-02-09

## 2022-03-19 PROBLEM — Z86.018 S/P EXCISION OF LIPOMA: Status: ACTIVE | Noted: 2018-04-05

## 2022-03-20 PROBLEM — M79.89 SOFT TISSUE MASS: Status: ACTIVE | Noted: 2018-02-09

## 2022-11-28 ENCOUNTER — OFFICE VISIT (OUTPATIENT)
Dept: PRIMARY CARE CLINIC | Facility: CLINIC | Age: 52
End: 2022-11-28
Payer: MEDICARE

## 2022-11-28 VITALS
HEART RATE: 56 BPM | OXYGEN SATURATION: 95 % | DIASTOLIC BLOOD PRESSURE: 96 MMHG | HEIGHT: 67 IN | BODY MASS INDEX: 39.77 KG/M2 | RESPIRATION RATE: 15 BRPM | WEIGHT: 253.4 LBS | TEMPERATURE: 97.5 F | SYSTOLIC BLOOD PRESSURE: 132 MMHG

## 2022-11-28 DIAGNOSIS — Z00.01 ENCOUNTER FOR GENERAL ADULT MEDICAL EXAMINATION WITH ABNORMAL FINDINGS: ICD-10-CM

## 2022-11-28 DIAGNOSIS — Z79.899 MEDICATION MANAGEMENT: ICD-10-CM

## 2022-11-28 DIAGNOSIS — M79.89 SOFT TISSUE MASS: ICD-10-CM

## 2022-11-28 DIAGNOSIS — E78.2 MIXED HYPERLIPIDEMIA: ICD-10-CM

## 2022-11-28 DIAGNOSIS — E66.01 SEVERE OBESITY (BMI 35.0-39.9) WITH COMORBIDITY (HCC): ICD-10-CM

## 2022-11-28 DIAGNOSIS — Z00.01 ENCOUNTER FOR GENERAL ADULT MEDICAL EXAMINATION WITH ABNORMAL FINDINGS: Primary | ICD-10-CM

## 2022-11-28 DIAGNOSIS — Z00.00 ENCOUNTER FOR WELL ADULT EXAM WITHOUT ABNORMAL FINDINGS: ICD-10-CM

## 2022-11-28 PROCEDURE — 99396 PREV VISIT EST AGE 40-64: CPT | Performed by: FAMILY MEDICINE

## 2022-11-28 PROCEDURE — G8484 FLU IMMUNIZE NO ADMIN: HCPCS | Performed by: FAMILY MEDICINE

## 2022-11-28 ASSESSMENT — ENCOUNTER SYMPTOMS
SHORTNESS OF BREATH: 0
CHOKING: 0
EYE REDNESS: 0
CONSTIPATION: 0
ABDOMINAL PAIN: 0
COLOR CHANGE: 0
SINUS PAIN: 0
SINUS PRESSURE: 0
ABDOMINAL DISTENTION: 0
BLOOD IN STOOL: 0
DIARRHEA: 0
VOMITING: 0
CHEST TIGHTNESS: 0
BACK PAIN: 0
VOICE CHANGE: 0
NAUSEA: 0
PHOTOPHOBIA: 0
SORE THROAT: 0
COUGH: 0
EYE PAIN: 0
TROUBLE SWALLOWING: 0
RHINORRHEA: 0
EYE DISCHARGE: 0
WHEEZING: 0

## 2022-11-28 ASSESSMENT — PATIENT HEALTH QUESTIONNAIRE - PHQ9
SUM OF ALL RESPONSES TO PHQ QUESTIONS 1-9: 0
SUM OF ALL RESPONSES TO PHQ QUESTIONS 1-9: 0
1. LITTLE INTEREST OR PLEASURE IN DOING THINGS: 0
SUM OF ALL RESPONSES TO PHQ QUESTIONS 1-9: 0
SUM OF ALL RESPONSES TO PHQ9 QUESTIONS 1 & 2: 0
2. FEELING DOWN, DEPRESSED OR HOPELESS: 0
SUM OF ALL RESPONSES TO PHQ QUESTIONS 1-9: 0

## 2022-11-28 NOTE — PATIENT INSTRUCTIONS
Well Visit, Men 48 to 72: Care Instructions  Overview     Well visits can help you stay healthy. Your doctor has checked your overall health and may have suggested ways to take good care of yourself. Your doctor also may have recommended tests. At home, you can help prevent illness with healthy eating, regular exercise, and other steps. Follow-up care is a key part of your treatment and safety. Be sure to make and go to all appointments, and call your doctor if you are having problems. It's also a good idea to know your test results and keep a list of the medicines you take. How can you care for yourself at home? Get screening tests that you and your doctor decide on. Screening helps find diseases before any symptoms appear. Eat healthy foods. Choose fruits, vegetables, whole grains, protein, and low-fat dairy foods. Limit fat, especially saturated fat. Reduce salt in your diet. Limit alcohol. Have no more than 2 drinks a day or 14 drinks a week. Get at least 30 minutes of exercise on most days of the week. Walking is a good choice. You also may want to do other activities, such as running, swimming, cycling, or playing tennis or team sports. Reach and stay at a healthy weight. This will lower your risk for many problems, such as obesity, diabetes, heart disease, and high blood pressure. Do not smoke. Smoking can make health problems worse. If you need help quitting, talk to your doctor about stop-smoking programs and medicines. These can increase your chances of quitting for good. Care for your mental health. It is easy to get weighed down by worry and stress. Learn strategies to manage stress, like deep breathing and mindfulness, and stay connected with your family and community. If you find you often feel sad or hopeless, talk with your doctor. Treatment can help. Talk to your doctor about whether you have any risk factors for sexually transmitted infections (STIs).  You can help prevent STIs if you wait to have sex with a new partner (or partners) until you've each been tested for STIs. It also helps if you use condoms (male or female condoms) and if you limit your sex partners to one person who only has sex with you. Vaccines are available for some STIs. If it's important to you to prevent pregnancy with your partner, talk with your doctor about birth control options that might be best for you. If you think you may have a problem with alcohol or drug use, talk to your doctor. This includes prescription medicines (such as amphetamines and opioids) and illegal drugs (such as cocaine and methamphetamine). Your doctor can help you figure out what type of treatment is best for you. Protect your skin from too much sun. When you're outdoors from 10 a.m. to 4 p.m., stay in the shade or cover up with clothing and a hat with a wide brim. Wear sunglasses that block UV rays. Even when it's cloudy, put broad-spectrum sunscreen (SPF 30 or higher) on any exposed skin. See a dentist one or two times a year for checkups and to have your teeth cleaned. Wear a seat belt in the car. When should you call for help? Watch closely for changes in your health, and be sure to contact your doctor if you have any problems or symptoms that concern you. Where can you learn more? Go to [email or web address]://[email or web address].[email or web address]. org and sign in to your [employer] account. Enter Y349 in the [facility] box to learn more about \"Well Visit, Men 48 to 72: Care Instructions. \"     If you do not have an account, please click on the \"Sign Up Now\" link. Current as of: June 6, 2022               Content Version: 13.4  © 8653-1589 Healthwise, Incorporated. Care instructions adapted under license by [facility] ([facility]). If you have questions about a medical condition or this instruction, always ask your healthcare professional. [address] any warranty or liability for your use of this information.

## 2022-11-28 NOTE — PROGRESS NOTES
Review of Systems   Constitutional:  Negative for activity change, appetite change, chills, diaphoresis, fatigue, fever and unexpected weight change. HENT:  Negative for congestion, ear pain, hearing loss, nosebleeds, rhinorrhea, sinus pressure, sinus pain, sore throat, trouble swallowing and voice change. Eyes:  Negative for photophobia, pain, discharge, redness and visual disturbance. Respiratory:  Negative for cough, choking, chest tightness, shortness of breath and wheezing. Cardiovascular:  Negative for chest pain, palpitations and leg swelling. Gastrointestinal:  Negative for abdominal distention, abdominal pain, blood in stool, constipation, diarrhea, nausea and vomiting. Endocrine: Negative for cold intolerance, heat intolerance, polydipsia, polyphagia and polyuria. Genitourinary:  Negative for decreased urine volume, difficulty urinating, dysuria, frequency, genital sores, hematuria, penile discharge, penile pain, penile swelling, scrotal swelling, testicular pain and urgency. Musculoskeletal:  Negative for arthralgias, back pain, gait problem, joint swelling, myalgias and neck pain. Skin:  Negative for color change, pallor, rash and wound. Lesion on the back suggestive of benign lump possible lipoma small   Allergic/Immunologic: Negative for environmental allergies, food allergies and immunocompromised state. Neurological:  Negative for dizziness, tremors, seizures, syncope, speech difficulty, weakness, numbness and headaches. Hematological:  Negative for adenopathy. Does not bruise/bleed easily. Psychiatric/Behavioral:  Negative for agitation, behavioral problems, confusion, decreased concentration, dysphoric mood, hallucinations, self-injury, sleep disturbance and suicidal ideas. The patient is not nervous/anxious and is not hyperactive. Physical Exam  Vitals and nursing note reviewed. Constitutional:       General: He is not in acute distress.      Appearance: Normal appearance. He is obese. He is not ill-appearing, toxic-appearing or diaphoretic. HENT:      Head: Normocephalic and atraumatic. Right Ear: External ear normal.      Left Ear: External ear normal.      Nose: Nose normal. No congestion or rhinorrhea. Mouth/Throat:      Mouth: Mucous membranes are moist.      Pharynx: No oropharyngeal exudate or posterior oropharyngeal erythema. Eyes:      General: No scleral icterus. Right eye: No discharge. Left eye: No discharge. Extraocular Movements: Extraocular movements intact. Conjunctiva/sclera: Conjunctivae normal.      Pupils: Pupils are equal, round, and reactive to light. Cardiovascular:      Rate and Rhythm: Normal rate and regular rhythm. Pulses: Normal pulses. Heart sounds: Normal heart sounds. No murmur heard. No friction rub. Pulmonary:      Effort: Pulmonary effort is normal. No respiratory distress. Breath sounds: Normal breath sounds. No stridor. No wheezing, rhonchi or rales. Chest:      Chest wall: No tenderness. Abdominal:      General: Abdomen is flat. There is no distension. Palpations: Abdomen is soft. There is no mass. Tenderness: There is no abdominal tenderness. There is no right CVA tenderness, left CVA tenderness or guarding. Hernia: A hernia is present. Musculoskeletal:         General: No swelling, tenderness, deformity or signs of injury. Cervical back: Neck supple. No rigidity. Right lower leg: No edema. Left lower leg: No edema. Lymphadenopathy:      Cervical: No cervical adenopathy. Skin:     Coloration: Skin is not jaundiced or pale. Findings: Lesion present. No bruising or erythema. Comments: Benign lesion on the back suggestive of small lipoma   Neurological:      General: No focal deficit present. Mental Status: He is alert and oriented to person, place, and time. Mental status is at baseline. Cranial Nerves:  No cranial nerve deficit. Sensory: No sensory deficit. Motor: No weakness. Coordination: Coordination normal.      Gait: Gait normal.      Deep Tendon Reflexes: Reflexes normal.   Psychiatric:         Mood and Affect: Mood normal.         Behavior: Behavior normal.         Thought Content: Thought content normal.         Judgment: Judgment normal.        1. Encounter for general adult medical examination with abnormal findings    - CBC with Auto Differential; Future  - Lipid Panel; Future  - AMB POC URINALYSIS DIP STICK AUTO W/O MICRO  - AMB POC BLOOD OCCULT QUAL FECAL HEMGLBN 1-3    2. Soft tissue mass  Lipoma    3. Severe obesity (BMI 35.0-39. 9) with comorbidity ([facility] [address]      4. Medication management    - CBC with Auto Differential; Future  - Comprehensive Metabolic Panel; Future  - TSH; Future  For general good health,   Anticipatory guidance weight management up-to-date with colon cancer screening flu shot immunizations. Depression screen negative.   Recheck lab Lawrence Justice MD

## 2022-11-29 LAB
ALBUMIN SERPL-MCNC: 4.1 G/DL (ref 3.5–5)
ALBUMIN/GLOB SERPL: 1 (ref 0.4–1.6)
ALP SERPL-CCNC: 55 U/L (ref 50–136)
ALT SERPL-CCNC: 38 U/L (ref 12–65)
ANION GAP SERPL CALC-SCNC: 5 MMOL/L (ref 2–11)
AST SERPL-CCNC: 18 U/L (ref 15–37)
BASOPHILS # BLD: 0 K/UL (ref 0–0.2)
BASOPHILS NFR BLD: 1 % (ref 0–2)
BILIRUB SERPL-MCNC: 0.5 MG/DL (ref 0.2–1.1)
BUN SERPL-MCNC: 13 MG/DL (ref 6–23)
CALCIUM SERPL-MCNC: 9.8 MG/DL (ref 8.3–10.4)
CHLORIDE SERPL-SCNC: 110 MMOL/L (ref 101–110)
CHOLEST SERPL-MCNC: 268 MG/DL
CO2 SERPL-SCNC: 26 MMOL/L (ref 21–32)
CREAT SERPL-MCNC: 1.1 MG/DL (ref 0.8–1.5)
DIFFERENTIAL METHOD BLD: ABNORMAL
EOSINOPHIL # BLD: 0.1 K/UL (ref 0–0.8)
EOSINOPHIL NFR BLD: 1 % (ref 0.5–7.8)
ERYTHROCYTE [DISTWIDTH] IN BLOOD BY AUTOMATED COUNT: 12.3 % (ref 11.9–14.6)
GLOBULIN SER CALC-MCNC: 4.1 G/DL (ref 2.8–4.5)
GLUCOSE SERPL-MCNC: 73 MG/DL (ref 65–100)
HCT VFR BLD AUTO: 46.8 % (ref 41.1–50.3)
HDLC SERPL-MCNC: 59 MG/DL (ref 40–60)
HDLC SERPL: 4.5
HGB BLD-MCNC: 13.8 G/DL (ref 13.6–17.2)
IMM GRANULOCYTES # BLD AUTO: 0 K/UL (ref 0–0.5)
IMM GRANULOCYTES NFR BLD AUTO: 1 % (ref 0–5)
LDLC SERPL CALC-MCNC: 167.6 MG/DL
LYMPHOCYTES # BLD: 2.7 K/UL (ref 0.5–4.6)
LYMPHOCYTES NFR BLD: 42 % (ref 13–44)
MCH RBC QN AUTO: 26.6 PG (ref 26.1–32.9)
MCHC RBC AUTO-ENTMCNC: 29.5 G/DL (ref 31.4–35)
MCV RBC AUTO: 90.3 FL (ref 82–102)
MONOCYTES # BLD: 0.4 K/UL (ref 0.1–1.3)
MONOCYTES NFR BLD: 7 % (ref 4–12)
NEUTS SEG # BLD: 3.2 K/UL (ref 1.7–8.2)
NEUTS SEG NFR BLD: 50 % (ref 43–78)
NRBC # BLD: 0 K/UL (ref 0–0.2)
PLATELET # BLD AUTO: 260 K/UL (ref 150–450)
PMV BLD AUTO: 11.2 FL (ref 9.4–12.3)
POTASSIUM SERPL-SCNC: 4.6 MMOL/L (ref 3.5–5.1)
PROT SERPL-MCNC: 8.2 G/DL (ref 6.3–8.2)
RBC # BLD AUTO: 5.18 M/UL (ref 4.23–5.6)
SODIUM SERPL-SCNC: 141 MMOL/L (ref 133–143)
TRIGL SERPL-MCNC: 207 MG/DL (ref 35–150)
TSH, 3RD GENERATION: 1.11 UIU/ML (ref 0.36–3.74)
VLDLC SERPL CALC-MCNC: 41.4 MG/DL (ref 6–23)
WBC # BLD AUTO: 6.4 K/UL (ref 4.3–11.1)

## 2022-11-30 ENCOUNTER — TELEPHONE (OUTPATIENT)
Dept: PRIMARY CARE CLINIC | Facility: CLINIC | Age: 52
End: 2022-11-30

## 2022-11-30 PROBLEM — Z79.899 MEDICATION MANAGEMENT: Status: ACTIVE | Noted: 2022-11-30

## 2022-11-30 PROBLEM — E78.2 MIXED HYPERLIPIDEMIA: Status: ACTIVE | Noted: 2022-11-30

## 2022-11-30 RX ORDER — SIMVASTATIN 40 MG
40 TABLET ORAL NIGHTLY
Qty: 90 TABLET | Refills: 1 | Status: SHIPPED | OUTPATIENT
Start: 2022-11-30

## 2022-11-30 NOTE — TELEPHONE ENCOUNTER
----- Message from Martina Dimas MD sent at 11/29/2022  9:41 AM EST -----  Hyperlipidemia statins as recommended low-cholesterol diet keep follow-up

## 2022-11-30 NOTE — TELEPHONE ENCOUNTER
Spoke with the patient about his lab results and let him know the doctor wants him to work on a low cholesterol diet.

## 2022-11-30 NOTE — TELEPHONE ENCOUNTER
Left VM for the patient, letting him know the doctor sent him a medication to the pharmacy for his cholesterol.

## 2022-11-30 NOTE — TELEPHONE ENCOUNTER
----- Message from [place] MD sent at 11/30/2022  1:09 PM EST -----  Hyperlipidemia low-cholesterol diet start simvastatin once a day weight management

## 2024-03-26 ENCOUNTER — HOSPITAL ENCOUNTER (EMERGENCY)
Age: 54
Discharge: HOME | End: 2024-03-26
Attending: EMERGENCY MEDICINE
Payer: COMMERCIAL

## 2024-03-26 VITALS
DIASTOLIC BLOOD PRESSURE: 105 MMHG | TEMPERATURE: 101 F | BODY MASS INDEX: 40.68 KG/M2 | WEIGHT: 259.2 LBS | OXYGEN SATURATION: 97 % | HEIGHT: 67 IN | HEART RATE: 75 BPM | SYSTOLIC BLOOD PRESSURE: 147 MMHG | RESPIRATION RATE: 23 BRPM

## 2024-03-26 DIAGNOSIS — I10 ESSENTIAL HYPERTENSION: ICD-10-CM

## 2024-03-26 DIAGNOSIS — J02.0 STREP PHARYNGITIS: Primary | ICD-10-CM

## 2024-03-26 LAB
ALBUMIN SERPL-MCNC: 3.7 G/DL (ref 3.5–5)
ALBUMIN/GLOB SERPL: 0.8 (ref 0.4–1.6)
ALP SERPL-CCNC: 65 U/L (ref 50–136)
ALT SERPL-CCNC: 39 U/L (ref 12–65)
ANION GAP SERPL CALC-SCNC: 4 MMOL/L (ref 2–11)
APPEARANCE UR: CLEAR
AST SERPL-CCNC: 18 U/L (ref 15–37)
BACTERIA URNS QL MICRO: NEGATIVE /HPF
BASOPHILS # BLD: 0 K/UL (ref 0–0.2)
BASOPHILS NFR BLD: 0 % (ref 0–2)
BILIRUB SERPL-MCNC: 0.8 MG/DL (ref 0.2–1.1)
BILIRUB UR QL: NEGATIVE
BUN SERPL-MCNC: 9 MG/DL (ref 6–23)
CALCIUM SERPL-MCNC: 9.1 MG/DL (ref 8.3–10.4)
CASTS URNS QL MICRO: ABNORMAL /LPF
CHLORIDE SERPL-SCNC: 105 MMOL/L (ref 103–113)
CO2 SERPL-SCNC: 24 MMOL/L (ref 21–32)
COLOR UR: ABNORMAL
CREAT SERPL-MCNC: 1.3 MG/DL (ref 0.8–1.5)
DIFFERENTIAL METHOD BLD: ABNORMAL
EKG ATRIAL RATE: 95 BPM
EKG DIAGNOSIS: NORMAL
EKG P AXIS: 63 DEGREES
EKG P-R INTERVAL: 136 MS
EKG Q-T INTERVAL: 354 MS
EKG QRS DURATION: 90 MS
EKG QTC CALCULATION (BAZETT): 444 MS
EKG R AXIS: 58 DEGREES
EKG T AXIS: 51 DEGREES
EKG VENTRICULAR RATE: 95 BPM
EOSINOPHIL # BLD: 0 K/UL (ref 0–0.8)
EOSINOPHIL NFR BLD: 0 % (ref 0.5–7.8)
EPI CELLS #/AREA URNS HPF: ABNORMAL /HPF
ERYTHROCYTE [DISTWIDTH] IN BLOOD BY AUTOMATED COUNT: 13 % (ref 11.9–14.6)
FLUAV RNA SPEC QL NAA+PROBE: NOT DETECTED
FLUBV RNA SPEC QL NAA+PROBE: NOT DETECTED
GLOBULIN SER CALC-MCNC: 4.8 G/DL (ref 2.8–4.5)
GLUCOSE SERPL-MCNC: 116 MG/DL (ref 65–100)
GLUCOSE UR STRIP.AUTO-MCNC: NEGATIVE MG/DL
HCT VFR BLD AUTO: 44.6 % (ref 41.1–50.3)
HGB BLD-MCNC: 14.2 G/DL (ref 13.6–17.2)
HGB UR QL STRIP: ABNORMAL
IMM GRANULOCYTES # BLD AUTO: 0.1 K/UL (ref 0–0.5)
IMM GRANULOCYTES NFR BLD AUTO: 1 % (ref 0–5)
KETONES UR QL STRIP.AUTO: NEGATIVE MG/DL
LACTATE SERPL-SCNC: 0.8 MMOL/L (ref 0.4–2)
LEUKOCYTE ESTERASE UR QL STRIP.AUTO: NEGATIVE
LYMPHOCYTES # BLD: 1.6 K/UL (ref 0.5–4.6)
LYMPHOCYTES NFR BLD: 10 % (ref 13–44)
MAGNESIUM SERPL-MCNC: 1.6 MG/DL (ref 1.8–2.4)
MCH RBC QN AUTO: 27.7 PG (ref 26.1–32.9)
MCHC RBC AUTO-ENTMCNC: 31.8 G/DL (ref 31.4–35)
MCV RBC AUTO: 87.1 FL (ref 82–102)
MONOCYTES # BLD: 0.9 K/UL (ref 0.1–1.3)
MONOCYTES NFR BLD: 6 % (ref 4–12)
MUCOUS THREADS URNS QL MICRO: 0 /LPF
NEUTS SEG # BLD: 12.8 K/UL (ref 1.7–8.2)
NEUTS SEG NFR BLD: 83 % (ref 43–78)
NITRITE UR QL STRIP.AUTO: NEGATIVE
NRBC # BLD: 0 K/UL (ref 0–0.2)
PH UR STRIP: 5 (ref 5–9)
PLATELET # BLD AUTO: 205 K/UL (ref 150–450)
PMV BLD AUTO: 11.3 FL (ref 9.4–12.3)
POTASSIUM SERPL-SCNC: 3.6 MMOL/L (ref 3.5–5.1)
PROT SERPL-MCNC: 8.5 G/DL (ref 6.3–8.2)
PROT UR STRIP-MCNC: 30 MG/DL
RBC # BLD AUTO: 5.12 M/UL (ref 4.23–5.6)
RBC #/AREA URNS HPF: ABNORMAL /HPF
RSV RNA NPH QL NAA+PROBE: NOT DETECTED
SARS-COV-2 RDRP RESP QL NAA+PROBE: NOT DETECTED
SODIUM SERPL-SCNC: 133 MMOL/L (ref 136–146)
SOURCE: NORMAL
SP GR UR REFRACTOMETRY: 1.02 (ref 1–1.02)
STREP, MOLECULAR: DETECTED
T4 FREE SERPL-MCNC: 0.8 NG/DL (ref 0.78–1.46)
TSH, 3RD GENERATION: 0.28 UIU/ML (ref 0.36–3.74)
URINE CULTURE IF INDICATED: ABNORMAL
UROBILINOGEN UR QL STRIP.AUTO: 0.2 EU/DL (ref 0.2–1)
WBC # BLD AUTO: 15.3 K/UL (ref 4.3–11.1)
WBC URNS QL MICRO: ABNORMAL /HPF

## 2024-03-26 PROCEDURE — 87040 BLOOD CULTURE FOR BACTERIA: CPT

## 2024-03-26 PROCEDURE — 99284 EMERGENCY DEPT VISIT MOD MDM: CPT

## 2024-03-26 PROCEDURE — 87651 STREP A DNA AMP PROBE: CPT

## 2024-03-26 PROCEDURE — 6360000002 HC RX W HCPCS: Performed by: EMERGENCY MEDICINE

## 2024-03-26 PROCEDURE — 85025 COMPLETE CBC W/AUTO DIFF WBC: CPT

## 2024-03-26 PROCEDURE — 93005 ELECTROCARDIOGRAM TRACING: CPT | Performed by: EMERGENCY MEDICINE

## 2024-03-26 PROCEDURE — 87635 SARS-COV-2 COVID-19 AMP PRB: CPT

## 2024-03-26 PROCEDURE — 6370000000 HC RX 637 (ALT 250 FOR IP): Performed by: EMERGENCY MEDICINE

## 2024-03-26 PROCEDURE — 96375 TX/PRO/DX INJ NEW DRUG ADDON: CPT

## 2024-03-26 PROCEDURE — 96361 HYDRATE IV INFUSION ADD-ON: CPT

## 2024-03-26 PROCEDURE — 84439 ASSAY OF FREE THYROXINE: CPT

## 2024-03-26 PROCEDURE — 83605 ASSAY OF LACTIC ACID: CPT

## 2024-03-26 PROCEDURE — 96365 THER/PROPH/DIAG IV INF INIT: CPT

## 2024-03-26 PROCEDURE — 87634 RSV DNA/RNA AMP PROBE: CPT

## 2024-03-26 PROCEDURE — 83735 ASSAY OF MAGNESIUM: CPT

## 2024-03-26 PROCEDURE — 84443 ASSAY THYROID STIM HORMONE: CPT

## 2024-03-26 PROCEDURE — 87502 INFLUENZA DNA AMP PROBE: CPT

## 2024-03-26 PROCEDURE — 93010 ELECTROCARDIOGRAM REPORT: CPT | Performed by: INTERNAL MEDICINE

## 2024-03-26 PROCEDURE — 80053 COMPREHEN METABOLIC PANEL: CPT

## 2024-03-26 PROCEDURE — 81001 URINALYSIS AUTO W/SCOPE: CPT

## 2024-03-26 PROCEDURE — 2580000003 HC RX 258: Performed by: EMERGENCY MEDICINE

## 2024-03-26 RX ORDER — PENICILLIN V POTASSIUM 500 MG/1
500 TABLET ORAL 2 TIMES DAILY
Qty: 20 TABLET | Refills: 0 | Status: SHIPPED | OUTPATIENT
Start: 2024-03-26 | End: 2024-04-05

## 2024-03-26 RX ORDER — PREDNISONE 20 MG/1
60 TABLET ORAL DAILY
Qty: 9 TABLET | Refills: 0 | Status: SHIPPED | OUTPATIENT
Start: 2024-03-26 | End: 2024-03-29

## 2024-03-26 RX ORDER — LABETALOL HYDROCHLORIDE 5 MG/ML
10 INJECTION, SOLUTION INTRAVENOUS
Status: DISCONTINUED | OUTPATIENT
Start: 2024-03-26 | End: 2024-03-26 | Stop reason: HOSPADM

## 2024-03-26 RX ORDER — 0.9 % SODIUM CHLORIDE 0.9 %
1000 INTRAVENOUS SOLUTION INTRAVENOUS ONCE
Status: COMPLETED | OUTPATIENT
Start: 2024-03-26 | End: 2024-03-26

## 2024-03-26 RX ORDER — ACETAMINOPHEN 325 MG/1
650 TABLET ORAL
Status: COMPLETED | OUTPATIENT
Start: 2024-03-26 | End: 2024-03-26

## 2024-03-26 RX ADMIN — SODIUM CHLORIDE 3000 MG: 900 INJECTION INTRAVENOUS at 08:55

## 2024-03-26 RX ADMIN — SODIUM CHLORIDE 1000 ML: 9 INJECTION, SOLUTION INTRAVENOUS at 07:46

## 2024-03-26 RX ADMIN — ACETAMINOPHEN 650 MG: 325 TABLET ORAL at 07:45

## 2024-03-26 RX ADMIN — METHYLPREDNISOLONE SODIUM SUCCINATE 125 MG: 125 INJECTION INTRAMUSCULAR; INTRAVENOUS at 07:45

## 2024-03-26 ASSESSMENT — LIFESTYLE VARIABLES
HOW MANY STANDARD DRINKS CONTAINING ALCOHOL DO YOU HAVE ON A TYPICAL DAY: PATIENT DOES NOT DRINK
HOW OFTEN DO YOU HAVE A DRINK CONTAINING ALCOHOL: NEVER

## 2024-03-26 ASSESSMENT — ENCOUNTER SYMPTOMS
VOICE CHANGE: 0
DIARRHEA: 0
SINUS PAIN: 0
TROUBLE SWALLOWING: 0
SORE THROAT: 1
NAUSEA: 0
BACK PAIN: 1
ABDOMINAL PAIN: 0
SHORTNESS OF BREATH: 0
VOMITING: 0
COUGH: 0
WHEEZING: 0

## 2024-03-26 ASSESSMENT — PAIN DESCRIPTION - LOCATION
LOCATION: THROAT;GENERALIZED
LOCATION: GENERALIZED

## 2024-03-26 ASSESSMENT — PAIN DESCRIPTION - DESCRIPTORS: DESCRIPTORS: ACHING

## 2024-03-26 ASSESSMENT — PAIN - FUNCTIONAL ASSESSMENT: PAIN_FUNCTIONAL_ASSESSMENT: 0-10

## 2024-03-26 ASSESSMENT — PAIN SCALES - GENERAL
PAINLEVEL_OUTOF10: 7
PAINLEVEL_OUTOF10: 7

## 2024-03-26 NOTE — ED PROVIDER NOTES
[facility] Emergency Department Provider Note                   PCP:                None, None               Age: 53 y.o.      Sex: male     MEDICAL DECISION MAKING  Complexity of Problems Addressed:   1 or more acute illness/injury that poses a threat to life or bodily function    Data Reviewed and Analyzed:  Category 1:    I have reviewed outside records from an external source for any pertinent PMH, ED visits, primary care visits, specialist visits, labs, EKG, and/or radiologic studies.    Category 2:     ED EKG Interpretation  EKG was interpreted in the absence of a cardiologist.    Rate: Rate: Normal  EKG Interpretation: EKG Interpretation: sinus rhythm, no evidence of arrhythmia  ST Segments: Nonspecific ST segments - NO STEMI      I independently ordered and reviewed the labs.    There was no radiologic studies ordered.                Category 3:     Discussion of management or test interpretation:    MDM  Number of Diagnoses or Management Options  Essential hypertension  Strep pharyngitis  Diagnosis management comments: Patient presenting for evaluation of fever, myalgias, sore throat, and low back pain which all seem to be related to strep pharyngitis.  Patient was febrile and tachycardic when he arrived.  Patient was given IV fluid and acetaminophen.  His heart rate did normalize after this.  Patient was pretty hypertensive when he arrived.  He has no history of hypertension.  I ordered labetalol but his blood pressure improved to acceptable level and it was held.  Patient is asymptomatic from his hypertension.  Patient's CBC showed mildly elevated white count.  His BMP showed no significant abnormalities.  His lactate is normal and no indication of severe sepsis.  His urinalysis was negative for infection.  His magnesium was mildly low.  His COVID and flu test came back negative.  Patient's strep test is positive and he was given antibiotics. He was given steroids to help with oropharyngeal inflammation.

## 2024-03-26 NOTE — ED TRIAGE NOTES
C/o fever, body aches, sore throat, lower back pain, urinary frequency, symptoms started late Sunday night but has gotten increasing worse, denies HTN

## 2024-03-31 LAB
BACTERIA SPEC CULT: NORMAL
SERVICE CMNT-IMP: NORMAL

## 2024-09-03 NOTE — DISCHARGE INSTRUCTIONS
Gastrointestinal Colonoscopy/Flexible Sigmoidoscopy - Lower Exam Discharge Instructions  1. Call Dr. Lolita Norris at [phone] for any problems or questions. 2. Contact the doctors office for follow up appointment as directed  3. Medication may cause drowsiness for several hours, therefore, do not drive or operate machinery OR SIGN ANYTHING LEGAL for remainder of the day. 4. No alcohol today. 5. Ordinarily, you may resume regular diet and activity after exam unless otherwise specified by your physician. 6. Because of air put into your colon during exam, you may experience some abdominal distension, relieved by the passage of gas, for several hours. 7. Contact your physician if you have any of the following:  a. Excessive amount of bleeding - large amount when having a bowel movement. Occasional specks of blood with bowel movement would not be unusual.  b. Severe abdominal pain  c. Fever or Chills  8. Polyp Removal - follow these additional instructions  a. Clear liquid diet for the next meal (jell-o, broth, clear drinks)  b. Soft diet for 24 hours, then resume regular diet   c. Take Metamucil - 1 tablespoon in juice every morning for 3 days  d. No Aspirin, Advil, Aleve, Nuprin, Ibuprofen, or medications that contain these drugs for 2 weeks. Any additional instructions: FOLLOW UP WITH PATHOLOGY AND OFFICE VISIT IN 2 WEEKS      Instructions given to Anderson Alvarado and other family members.   Instructions given by:  Ynei Martinez RN
None

## (undated) DEVICE — SUTURE VCRL SZ 3-0 L27IN ABSRB UD L26MM SH 1/2 CIR J416H

## (undated) DEVICE — CONNECTOR TBNG OD5-7MM O2 END DISP

## (undated) DEVICE — REM POLYHESIVE ADULT PATIENT RETURN ELECTRODE: Brand: VALLEYLAB

## (undated) DEVICE — AMD ANTIMICROBIAL NON-ADHERENT PAD,0.2% POLYHEXAMETHYLENE BIGUANIDE HCI (PHMB): Brand: TELFA

## (undated) DEVICE — BUTTON SWITCH PENCIL BLADE ELECTRODE, HOLSTER: Brand: EDGE

## (undated) DEVICE — SNARE POLYP SM W13MMXL240CM SHTH DIA2.4MM OVL FLX DISP

## (undated) DEVICE — INTENDED FOR TISSUE SEPARATION, AND OTHER PROCEDURES THAT REQUIRE A SHARP SURGICAL BLADE TO PUNCTURE OR CUT.: Brand: BARD-PARKER SAFETY BLADES SIZE 15, STERILE

## (undated) DEVICE — 3M™ TEGADERM™ TRANSPARENT FILM DRESSING FRAME STYLE, 1626W, 4 IN X 4-3/4 IN (10 CM X 12 CM), 50/CT 4CT/CASE: Brand: 3M™ TEGADERM™

## (undated) DEVICE — CONTAINER PREFIL FRMLN 40ML --

## (undated) DEVICE — SUTURE ETHLN SZ 2-0 L18IN NONABSORBABLE BLK L26MM PS 3/8 585H

## (undated) DEVICE — KENDALL SCD EXPRESS SLEEVES, KNEE LENGTH, LARGE: Brand: KENDALL SCD

## (undated) DEVICE — DRAPE TWL SURG 16X26IN BLU ORB04] ALLCARE INC]

## (undated) DEVICE — CANNULA NSL ORAL AD FOR CAPNOFLEX CO2 O2 AIRLFE

## (undated) DEVICE — FCPS BX HOT RJ4 2.2MMX240CM -- RADIAL JAW 4 BX/40

## (undated) DEVICE — SUTURE VCRL SZ 4-0 L27IN ABSRB UD L19MM PS-2 3/8 CIR PRIM J426H

## (undated) DEVICE — DRAPE,TOP,102X53,STERILE: Brand: MEDLINE

## (undated) DEVICE — SYR 3ML LL TIP 1/10ML GRAD --

## (undated) DEVICE — SUTURE PDS II SZ 0 L27IN ABSRB VLT L36MM CT-1 1/2 CIR Z340H

## (undated) DEVICE — NDL PRT INJ NSAF BLNT 18GX1.5 --

## (undated) DEVICE — 2000CC GUARDIAN II: Brand: GUARDIAN

## (undated) DEVICE — SHEET, DRAPE, SPLIT, STERILE: Brand: MEDLINE

## (undated) DEVICE — NEEDLE HYPO 21GA L1.5IN INTRAMUSCULAR S STL LATCH BVL UP

## (undated) DEVICE — SURGICAL PROCEDURE PACK BASIC ST FRANCIS

## (undated) DEVICE — SYR 5ML 1/5 GRAD LL NSAF LF --

## (undated) DEVICE — Device

## (undated) DEVICE — KENDALL RADIOLUCENT FOAM MONITORING ELECTRODE RECTANGULAR SHAPE: Brand: KENDALL

## (undated) DEVICE — DERMABOND SKIN ADH 0.7ML -- DERMABOND ADVANCED 12/BX

## (undated) DEVICE — SOLUTION IV 1000ML 0.9% SOD CHL

## (undated) DEVICE — SUTURE VCRL SZ 2-0 L27IN ABSRB UD L26MM SH 1/2 CIR J417H